# Patient Record
Sex: FEMALE | Race: WHITE | Employment: PART TIME | URBAN - METROPOLITAN AREA
[De-identification: names, ages, dates, MRNs, and addresses within clinical notes are randomized per-mention and may not be internally consistent; named-entity substitution may affect disease eponyms.]

---

## 2017-03-17 ENCOUNTER — HOSPITAL ENCOUNTER (OUTPATIENT)
Dept: CT IMAGING | Facility: HOSPITAL | Age: 67
Discharge: HOME | End: 2017-03-17
Attending: FAMILY MEDICINE
Payer: MEDICARE

## 2017-03-17 DIAGNOSIS — Z87.891 HISTORY OF SMOKING: ICD-10-CM

## 2017-03-18 NOTE — PROGRESS NOTES
Quick Note:    Citlalli Jaclyn Block    You have a few lung nodules that we will have to continue keeping an eye on, but stable. We will plan on repeating the CT scan in a year again!     Please call my office if you have any questions or do not completely und

## 2017-03-21 ENCOUNTER — HOSPITAL ENCOUNTER (OUTPATIENT)
Dept: MAMMOGRAPHY | Facility: HOSPITAL | Age: 67
Discharge: HOME | End: 2017-03-21
Attending: FAMILY MEDICINE
Payer: MEDICARE

## 2017-03-21 DIAGNOSIS — Z12.31 VISIT FOR SCREENING MAMMOGRAM: ICD-10-CM

## 2017-03-21 DIAGNOSIS — Z12.31 ENCOUNTER FOR SCREENING MAMMOGRAM FOR BREAST CANCER: ICD-10-CM

## 2017-03-21 PROCEDURE — 77063 BREAST TOMOSYNTHESIS BI: CPT

## 2017-03-21 PROCEDURE — 77067 SCR MAMMO BI INCL CAD: CPT

## 2017-03-23 NOTE — PROGRESS NOTES
Quick Note:    Citlalli Block    Mammogram is normal!     Please call my clinic if you have any questions or do not understand this message.     Take Care,   Ajit Blanton MD    ______

## 2017-05-07 ENCOUNTER — HOSPITAL ENCOUNTER (OUTPATIENT)
Facility: HOSPITAL | Age: 67
Setting detail: OBSERVATION
Discharge: HOME | End: 2017-05-08
Attending: EMERGENCY MEDICINE | Admitting: INTERNAL MEDICINE
Payer: MEDICARE

## 2017-05-07 ENCOUNTER — APPOINTMENT (OUTPATIENT)
Dept: GENERAL RADIOLOGY | Facility: HOSPITAL | Age: 67
End: 2017-05-07
Attending: EMERGENCY MEDICINE
Payer: MEDICARE

## 2017-05-07 DIAGNOSIS — R07.9 ACUTE CHEST PAIN: Primary | ICD-10-CM

## 2017-05-07 PROCEDURE — 85378 FIBRIN DEGRADE SEMIQUANT: CPT | Performed by: EMERGENCY MEDICINE

## 2017-05-07 PROCEDURE — 84484 ASSAY OF TROPONIN QUANT: CPT | Performed by: EMERGENCY MEDICINE

## 2017-05-07 PROCEDURE — 93005 ELECTROCARDIOGRAM TRACING: CPT

## 2017-05-07 PROCEDURE — 99285 EMERGENCY DEPT VISIT HI MDM: CPT

## 2017-05-07 PROCEDURE — 36415 COLL VENOUS BLD VENIPUNCTURE: CPT

## 2017-05-07 PROCEDURE — 80053 COMPREHEN METABOLIC PANEL: CPT | Performed by: EMERGENCY MEDICINE

## 2017-05-07 PROCEDURE — 71010 XR CHEST AP PORTABLE  (CPT=71010): CPT | Performed by: EMERGENCY MEDICINE

## 2017-05-07 PROCEDURE — 85025 COMPLETE CBC W/AUTO DIFF WBC: CPT | Performed by: EMERGENCY MEDICINE

## 2017-05-07 PROCEDURE — 93010 ELECTROCARDIOGRAM REPORT: CPT

## 2017-05-07 RX ORDER — VIT A/VIT C/VIT E/ZINC/COPPER 2148-113
1 TABLET ORAL 2 TIMES DAILY
COMMUNITY
End: 2017-10-06

## 2017-05-07 RX ORDER — PANTOPRAZOLE SODIUM 40 MG/1
40 TABLET, DELAYED RELEASE ORAL
Status: DISCONTINUED | OUTPATIENT
Start: 2017-05-08 | End: 2017-05-08

## 2017-05-07 RX ORDER — POLYETHYLENE GLYCOL 3350 17 G/17G
17 POWDER, FOR SOLUTION ORAL DAILY PRN
Status: DISCONTINUED | OUTPATIENT
Start: 2017-05-07 | End: 2017-05-08

## 2017-05-07 RX ORDER — BISACODYL 10 MG
10 SUPPOSITORY, RECTAL RECTAL
Status: DISCONTINUED | OUTPATIENT
Start: 2017-05-07 | End: 2017-05-08

## 2017-05-07 RX ORDER — PRAVASTATIN SODIUM 40 MG
40 TABLET ORAL NIGHTLY
COMMUNITY
End: 2017-08-01

## 2017-05-07 RX ORDER — NICOTINE 21 MG/24HR
1 PATCH, TRANSDERMAL 24 HOURS TRANSDERMAL DAILY
Status: DISCONTINUED | OUTPATIENT
Start: 2017-05-07 | End: 2017-05-08

## 2017-05-07 RX ORDER — ENOXAPARIN SODIUM 100 MG/ML
40 INJECTION SUBCUTANEOUS DAILY
Status: DISCONTINUED | OUTPATIENT
Start: 2017-05-07 | End: 2017-05-08

## 2017-05-07 RX ORDER — BUPROPION HYDROCHLORIDE 100 MG/1
100 TABLET ORAL 2 TIMES DAILY
COMMUNITY
End: 2017-07-06

## 2017-05-07 RX ORDER — ATORVASTATIN CALCIUM 10 MG/1
10 TABLET, FILM COATED ORAL NIGHTLY
Status: DISCONTINUED | OUTPATIENT
Start: 2017-05-07 | End: 2017-05-08

## 2017-05-07 RX ORDER — ONDANSETRON 2 MG/ML
4 INJECTION INTRAMUSCULAR; INTRAVENOUS EVERY 6 HOURS PRN
Status: DISCONTINUED | OUTPATIENT
Start: 2017-05-07 | End: 2017-05-08

## 2017-05-07 RX ORDER — TRAMADOL HYDROCHLORIDE 50 MG/1
50 TABLET ORAL EVERY 6 HOURS PRN
Status: DISCONTINUED | OUTPATIENT
Start: 2017-05-07 | End: 2017-05-08

## 2017-05-07 RX ORDER — ACETAMINOPHEN 325 MG/1
650 TABLET ORAL EVERY 6 HOURS PRN
Status: DISCONTINUED | OUTPATIENT
Start: 2017-05-07 | End: 2017-05-08

## 2017-05-07 RX ORDER — MYCOPHENOLATE MOFETIL 250 MG/1
500 CAPSULE ORAL 2 TIMES DAILY
Status: DISCONTINUED | OUTPATIENT
Start: 2017-05-07 | End: 2017-05-08

## 2017-05-07 RX ORDER — MYCOPHENOLATE MOFETIL 500 MG/1
500 TABLET ORAL 2 TIMES DAILY
COMMUNITY
End: 2017-10-06

## 2017-05-07 RX ORDER — ASPIRIN 81 MG/1
324 TABLET, CHEWABLE ORAL ONCE
Status: COMPLETED | OUTPATIENT
Start: 2017-05-07 | End: 2017-05-07

## 2017-05-07 RX ORDER — MELATONIN
325
Status: DISCONTINUED | OUTPATIENT
Start: 2017-05-08 | End: 2017-05-08

## 2017-05-07 RX ORDER — BUPROPION HYDROCHLORIDE 100 MG/1
100 TABLET ORAL 2 TIMES DAILY
Status: DISCONTINUED | OUTPATIENT
Start: 2017-05-07 | End: 2017-05-08

## 2017-05-07 NOTE — H&P
[facility] Hospitalist H&P       CC: chest pain    PCP: Jonathan Gillespie MD    History of Present Illness:     76 yo with HL, JAYSON, asthma, who is admitted for cp and RUE pain.  Pt reports yesterday, started having dull and sharp moderate pain to tricep region S-Adenosylmethionine (XOCHITL-E OR) Take 1 capsule by mouth daily. Disp:  Rfl:    aspirin 81 MG Oral Chew Tab Chew 81 mg by mouth daily. Disp:  Rfl:    Calcium Carbonate-Vitamin D 500-125 MG-UNIT Oral Tab Take 1 tablet by mouth daily.    Disp:  Rfl:    Ch tenderness or deformity. No epigastric or chest tenderness   Heart:  Regular rate and rhythm, S1, S2 normal, no murmur, rub or gallop appreciated, no LE edema   Abdomen:   Soft, non-tender.  Bowel sounds normal.  Non distended, no overt hernias   Extremitie is admitted for cp and RUE pain    **Atypical cp and RUE pain-doubt cardiac etiology  -trial of PPI and tylenol prn, tramadol prn  -pt with extensive FH of CAD, cards consulted to assess for stress. Appreciate. Spoke with RN  -serial trops ordered.  Initial

## 2017-05-07 NOTE — ED NOTES
Pt reevaluated by dr. Mallorie Delgado, informed of all test reports and plan of care, pt was advised admission. Verbalizing understanding.

## 2017-05-07 NOTE — PROGRESS NOTES
Brief Internal Medicine Note    Full Note to Follow      Pt is a 78 yo with HL, JAYSON, asthma, who is admitted for cp and RUE pain.   Pt reports yesterday, started having dull and sharp moderate pain to tricep region of RUE, worse with moving arm, and rashmi

## 2017-05-07 NOTE — ED PROVIDER NOTES
Patient Seen in: [facility] Emergency Department    History   Patient presents with:  Chest Pain Angina (cardiovascular)    Stated Complaint: chest pain    HPI    Patient is a 17-year-old female who states yesterday she developed chest pain which sh nightly. Alendronate Sodium 70 MG Oral Tab,  Take 1 tablet (70 mg total) by mouth once a week. S-Adenosylmethionine (XOCHITL-E OR),  Take 1 capsule by mouth daily. aspirin 81 MG Oral Chew Tab,  Chew 81 mg by mouth daily.      Calcium Carbonate-Vitamin D 05/07/17 1257 100 %   O2 Device 05/07/17 1257 None (Room air)       Current:/60 mmHg  Pulse 74  Temp(Src) 98.1 °F (36.7 °C) (Oral)  Resp 18  Ht 162.6 cm (5' 4\")  Wt 68.04 kg  BMI 25.73 kg/m2  SpO2 97%        Physical Exam  GENERAL: Patient resting c GOLD   RAINBOW DRAW LAVENDER   RAINBOW DRAW LIGHT GREEN   CBC W/ DIFFERENTIAL      EKG    Rate, intervals and axes as noted on EKG Report.   Rate: 88  Rhythm: Sinus Rhythm  Reading: Normal sinus rhythm, no acute changes          Chest x-ray no acute abnorma

## 2017-05-08 ENCOUNTER — APPOINTMENT (OUTPATIENT)
Dept: CV DIAGNOSTICS | Facility: HOSPITAL | Age: 67
End: 2017-05-08
Attending: INTERNAL MEDICINE
Payer: MEDICARE

## 2017-05-08 VITALS
RESPIRATION RATE: 18 BRPM | BODY MASS INDEX: 25.61 KG/M2 | TEMPERATURE: 98 F | WEIGHT: 150 LBS | HEIGHT: 64 IN | DIASTOLIC BLOOD PRESSURE: 44 MMHG | SYSTOLIC BLOOD PRESSURE: 114 MMHG | HEART RATE: 92 BPM | OXYGEN SATURATION: 100 %

## 2017-05-08 PROCEDURE — 93018 CV STRESS TEST I&R ONLY: CPT | Performed by: INTERNAL MEDICINE

## 2017-05-08 PROCEDURE — 93306 TTE W/DOPPLER COMPLETE: CPT | Performed by: INTERNAL MEDICINE

## 2017-05-08 PROCEDURE — 80048 BASIC METABOLIC PNL TOTAL CA: CPT | Performed by: HOSPITALIST

## 2017-05-08 PROCEDURE — 84484 ASSAY OF TROPONIN QUANT: CPT | Performed by: HOSPITALIST

## 2017-05-08 PROCEDURE — 83735 ASSAY OF MAGNESIUM: CPT | Performed by: HOSPITALIST

## 2017-05-08 PROCEDURE — 78452 HT MUSCLE IMAGE SPECT MULT: CPT | Performed by: INTERNAL MEDICINE

## 2017-05-08 PROCEDURE — 80061 LIPID PANEL: CPT | Performed by: INTERNAL MEDICINE

## 2017-05-08 PROCEDURE — 93017 CV STRESS TEST TRACING ONLY: CPT | Performed by: INTERNAL MEDICINE

## 2017-05-08 RX ORDER — PANTOPRAZOLE SODIUM 40 MG/1
40 TABLET, DELAYED RELEASE ORAL
Qty: 30 TABLET | Refills: 0 | Status: SHIPPED | OUTPATIENT
Start: 2017-05-08 | End: 2017-10-06

## 2017-05-08 NOTE — CONSULTS
[facility] Cardiology Consultation Marci Navas MD    The patient was interviewed, examined, the chart was reviewed and the consult was dictated.     This is a 77year old female with a chief complaint of right upper extremity discomfort and chest pressure    Imp

## 2017-05-08 NOTE — PLAN OF CARE
Problem: PAIN - ADULT  Goal: Verbalizes/displays adequate comfort level or patient’s stated pain goal  INTERVENTIONS:  - Encourage pt to monitor pain and request assistance  - Assess pain using appropriate pain scale  - Administer analgesics based on type coordinating discharge planning if the patient needs post-hospital services based on physician/LIP order or complex needs related to functional status, cognitive ability or social support system   Outcome: [address]

## 2017-05-08 NOTE — PROGRESS NOTES
Kat Plunkett for patient to have [facility] stress test this am per JORGE Marcano for Dr Antonio Brown. Jennifer Cruz

## 2017-05-08 NOTE — PROGRESS NOTES
[facility] Hospitalist Progress Note     PCP: Ira Zamudio MD    CC:  Follow up    SUBJECTIVE:  Pt laying in bed, having echo done. No chest pain.  Some mild R shoulder/tricep pain upon lifting arm up--says did help transfer her sister the other day    OBJECTI Oral BID   • atorvastatin  10 mg Oral Nightly   • nicotine  1 patch Transdermal Daily   • Pantoprazole Sodium  40 mg Oral QAM AC   • enoxaparin  40 mg Subcutaneous Daily        acetaminophen, PEG 3350, magnesium hydroxide, bisacodyl, ondansetron HCl, TraMA

## 2017-05-08 NOTE — CONSULTS
[facility]    PATIENT'S NAME: Edvin Lees   ATTENDING PHYSICIAN: Micaela Clements. Bhavesh Mahan MD   CONSULTING PHYSICIAN: Princess Bryan M.D.    PATIENT ACCOUNT#:   [de-identified]    LOCATION:  [address] [place] [address]  MEDICAL RECORD #:   [id number]       DATE OF BIRTH: Patient is a 2-pack a day smoker since she was 24. 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 FAMILY HISTORY:  Positive for heart disease.     REVIEW OF SYSTEMS:  Anxiety, stress, arm pain, chest discomfort

## 2017-05-12 NOTE — PROGRESS NOTES
Quick Note:    Citlalli Block    Your results are unremarkable. Stress test is normal!    Please call my office if you have any questions or do not completely understand this message.     Take Care,   Alton Keen  ______

## 2017-07-25 PROCEDURE — 84480 ASSAY TRIIODOTHYRONINE (T3): CPT | Performed by: INTERNAL MEDICINE

## 2017-08-01 PROBLEM — R07.9 ACUTE CHEST PAIN: Status: RESOLVED | Noted: 2017-05-07 | Resolved: 2017-08-01

## 2017-08-01 PROBLEM — Z23 NEED FOR SHINGLES VACCINE: Status: ACTIVE | Noted: 2017-08-01

## 2017-08-09 ENCOUNTER — HOSPITAL ENCOUNTER (OUTPATIENT)
Dept: BONE DENSITY | Age: 67
Discharge: HOME | End: 2017-08-09
Attending: FAMILY MEDICINE
Payer: MEDICARE

## 2017-08-09 DIAGNOSIS — M81.0 OSTEOPOROSIS, UNSPECIFIED OSTEOPOROSIS TYPE, UNSPECIFIED PATHOLOGICAL FRACTURE PRESENCE: ICD-10-CM

## 2017-08-09 PROCEDURE — 77080 DXA BONE DENSITY AXIAL: CPT | Performed by: FAMILY MEDICINE

## 2017-08-10 PROBLEM — M85.80 OSTEOPENIA: Status: ACTIVE | Noted: 2017-08-10

## 2017-08-10 NOTE — PROGRESS NOTES
Hello,     You no longer have osteoporosis and have the next step better : Osteopenia. This is good news. I believe we had talked about stopping the Alendronate as you have been on that for quite sometime.   If you are still comfortable, I would like you t

## 2018-03-27 PROBLEM — I25.10 CORONARY ARTERY CALCIFICATION: Status: ACTIVE | Noted: 2018-03-27

## 2018-03-27 PROBLEM — I25.84 CORONARY ARTERY CALCIFICATION: Status: ACTIVE | Noted: 2018-03-27

## 2018-05-01 PROCEDURE — 86160 COMPLEMENT ANTIGEN: CPT | Performed by: INTERNAL MEDICINE

## 2018-06-22 ENCOUNTER — OFFICE VISIT (OUTPATIENT)
Dept: SLEEP CENTER | Facility: HOSPITAL | Age: 68
End: 2018-06-22
Attending: INTERNAL MEDICINE
Payer: MEDICARE

## 2018-06-22 DIAGNOSIS — G47.33 OSA (OBSTRUCTIVE SLEEP APNEA): ICD-10-CM

## 2018-06-22 DIAGNOSIS — R06.83 SNORING: ICD-10-CM

## 2018-06-22 DIAGNOSIS — G47.10 HYPERSOMNIA: ICD-10-CM

## 2018-06-22 PROCEDURE — 95810 POLYSOM 6/> YRS 4/> PARAM: CPT

## 2018-06-26 NOTE — PROCEDURES
[address]       Accredited by the [facility] ([facility])    PATIENT'S NAME:        Phebe Schwab  ATTENDING PHYSICIAN:   Mary Ritter M.D. REFERRING PHYSICIAN:   Mary Ritter M.D.   PATIENT A time 304 minutes, sleep onset latency 18 minutes, REM onset latency 193 minutes, wake after sleep onset 84 minutes, for a sleep efficiency of 75%. SLEEP STAGING:  Stage 1, 1%; stage 2, 55%; stage 3, 28%; stage REM 15%.     RESPIRATORY MEASURES:  Patient

## 2018-07-25 PROCEDURE — 86225 DNA ANTIBODY NATIVE: CPT | Performed by: INTERNAL MEDICINE

## 2019-02-15 PROCEDURE — 86160 COMPLEMENT ANTIGEN: CPT | Performed by: INTERNAL MEDICINE

## 2019-02-15 PROCEDURE — 86225 DNA ANTIBODY NATIVE: CPT | Performed by: INTERNAL MEDICINE

## 2019-06-27 PROCEDURE — 82785 ASSAY OF IGE: CPT | Performed by: ALLERGY & IMMUNOLOGY

## 2019-06-27 PROCEDURE — 86003 ALLG SPEC IGE CRUDE XTRC EA: CPT | Performed by: ALLERGY & IMMUNOLOGY

## 2019-06-27 PROCEDURE — 36415 COLL VENOUS BLD VENIPUNCTURE: CPT | Performed by: ALLERGY & IMMUNOLOGY

## 2019-07-18 PROCEDURE — 86160 COMPLEMENT ANTIGEN: CPT | Performed by: INTERNAL MEDICINE

## 2020-05-08 PROBLEM — J43.9 PULMONARY EMPHYSEMA, UNSPECIFIED EMPHYSEMA TYPE (HCC): Status: ACTIVE | Noted: 2020-05-08

## 2020-10-19 ENCOUNTER — HOSPITAL ENCOUNTER (EMERGENCY)
Facility: HOSPITAL | Age: 70
Discharge: LEFT BEFORE DISCHARGE | End: 2020-10-19
Payer: MEDICARE

## 2020-11-02 ENCOUNTER — LAB ENCOUNTER (OUTPATIENT)
Dept: LAB | Facility: HOSPITAL | Age: 70
End: 2020-11-02
Attending: INTERNAL MEDICINE
Payer: MEDICARE

## 2020-11-02 DIAGNOSIS — E78.2 MIXED HYPERLIPIDEMIA: ICD-10-CM

## 2020-11-02 DIAGNOSIS — I10 ESSENTIAL HYPERTENSION: ICD-10-CM

## 2020-11-02 DIAGNOSIS — R42 DIZZINESS: Primary | ICD-10-CM

## 2020-11-02 DIAGNOSIS — R73.09 ELEVATED GLUCOSE: ICD-10-CM

## 2020-11-02 DIAGNOSIS — R76.8 POSITIVE ANA (ANTINUCLEAR ANTIBODY): ICD-10-CM

## 2020-11-02 PROCEDURE — 86160 COMPLEMENT ANTIGEN: CPT

## 2020-11-02 PROCEDURE — 80053 COMPREHEN METABOLIC PANEL: CPT

## 2020-11-02 PROCEDURE — 80061 LIPID PANEL: CPT

## 2020-11-02 PROCEDURE — 82565 ASSAY OF CREATININE: CPT

## 2020-11-02 PROCEDURE — 84443 ASSAY THYROID STIM HORMONE: CPT

## 2020-11-02 PROCEDURE — 85025 COMPLETE CBC W/AUTO DIFF WBC: CPT

## 2020-11-02 PROCEDURE — 85652 RBC SED RATE AUTOMATED: CPT

## 2020-11-02 PROCEDURE — 82248 BILIRUBIN DIRECT: CPT

## 2020-11-02 PROCEDURE — 36415 COLL VENOUS BLD VENIPUNCTURE: CPT

## 2020-11-02 PROCEDURE — 86140 C-REACTIVE PROTEIN: CPT

## 2020-11-02 PROCEDURE — 83036 HEMOGLOBIN GLYCOSYLATED A1C: CPT

## 2021-01-20 ENCOUNTER — IMMUNIZATION (OUTPATIENT)
Dept: LAB | Facility: HOSPITAL | Age: 71
End: 2021-01-20
Attending: EMERGENCY MEDICINE
Payer: MEDICARE

## 2021-01-20 DIAGNOSIS — Z23 NEED FOR VACCINATION: Primary | ICD-10-CM

## 2021-01-20 PROCEDURE — 0011A SARSCOV2 VAC 100MCG/0.5ML IM: CPT

## 2021-02-17 ENCOUNTER — IMMUNIZATION (OUTPATIENT)
Dept: LAB | Facility: HOSPITAL | Age: 71
End: 2021-02-17
Attending: EMERGENCY MEDICINE
Payer: MEDICARE

## 2021-02-17 DIAGNOSIS — Z23 NEED FOR VACCINATION: Primary | ICD-10-CM

## 2021-02-17 PROCEDURE — 0012A SARSCOV2 VAC 100MCG/0.5ML IM: CPT

## 2021-04-14 PROBLEM — R19.7 DIARRHEA: Status: ACTIVE | Noted: 2021-04-14

## 2021-04-14 PROBLEM — K21.9 GASTROESOPHAGEAL REFLUX DISEASE: Status: ACTIVE | Noted: 2021-04-14

## 2021-04-19 ENCOUNTER — LAB ENCOUNTER (OUTPATIENT)
Dept: LAB | Facility: HOSPITAL | Age: 71
End: 2021-04-19
Attending: NURSE PRACTITIONER
Payer: MEDICARE

## 2021-04-19 DIAGNOSIS — R19.7 DIARRHEA, UNSPECIFIED TYPE: ICD-10-CM

## 2021-04-19 PROCEDURE — 83516 IMMUNOASSAY NONANTIBODY: CPT

## 2021-04-19 PROCEDURE — 36415 COLL VENOUS BLD VENIPUNCTURE: CPT

## 2021-04-19 PROCEDURE — 82784 ASSAY IGA/IGD/IGG/IGM EACH: CPT

## 2021-04-20 ENCOUNTER — LAB ENCOUNTER (OUTPATIENT)
Dept: LAB | Facility: HOSPITAL | Age: 71
End: 2021-04-20
Attending: NURSE PRACTITIONER
Payer: MEDICARE

## 2021-04-20 DIAGNOSIS — R19.7 DIARRHEA, UNSPECIFIED TYPE: ICD-10-CM

## 2021-04-20 PROCEDURE — 87338 HPYLORI STOOL AG IA: CPT

## 2021-04-20 PROCEDURE — 83993 ASSAY FOR CALPROTECTIN FECAL: CPT

## 2021-04-20 PROCEDURE — 87329 GIARDIA AG IA: CPT

## 2021-04-20 PROCEDURE — 87493 C DIFF AMPLIFIED PROBE: CPT

## 2021-04-20 PROCEDURE — 87272 CRYPTOSPORIDIUM AG IF: CPT

## 2021-04-20 PROCEDURE — 82656 EL-1 FECAL QUAL/SEMIQ: CPT

## 2021-04-27 ENCOUNTER — OFFICE VISIT (OUTPATIENT)
Dept: SURGERY | Facility: CLINIC | Age: 71
End: 2021-04-27
Payer: MEDICARE

## 2021-04-27 VITALS — HEART RATE: 82 BPM | SYSTOLIC BLOOD PRESSURE: 155 MMHG | TEMPERATURE: 97 F | DIASTOLIC BLOOD PRESSURE: 69 MMHG

## 2021-04-27 DIAGNOSIS — N39.41 URGE INCONTINENCE: ICD-10-CM

## 2021-04-27 DIAGNOSIS — R82.90 URINE FINDING: Primary | ICD-10-CM

## 2021-04-27 PROCEDURE — 99203 OFFICE O/P NEW LOW 30 MIN: CPT | Performed by: UROLOGY

## 2021-04-27 PROCEDURE — 81003 URINALYSIS AUTO W/O SCOPE: CPT | Performed by: UROLOGY

## 2021-04-27 RX ORDER — OXYBUTYNIN CHLORIDE 10 MG/1
10 TABLET, EXTENDED RELEASE ORAL DAILY
Qty: 90 TABLET | Refills: 3 | Status: SHIPPED | OUTPATIENT
Start: 2021-04-27 | End: 2022-01-17

## 2021-04-27 NOTE — PROGRESS NOTES
Rooming Clinician: Andrea Jaquez is a 79year old female. Patient presents with:  Consult: c/o incontinence  3 years. Has seen blood on tissue after wiping. Lower abd pain.  3/10 on pain scal  Incontinence:  Stream: weak to dribbles, sometimes queta lisinopril 10 MG Oral Tab Take 10 mg by mouth daily. • FLUAD QUADRIVALENT 0.5 ML Intramuscular Prefilled Syringe Inject 0.5 mL into the muscle See Admin Instructions.  UNTIL FINISHED     • alendronate 35 MG Oral Tab Take 1 tablet (35 mg total) by mouth pain/belching Since a little girl had a lot of gas   • Food intolerance Too much black licorice causes reflux at night   • Hearing loss So says sister   • Heartburn March 2020    Reflux wakes me at night   • High cholesterol Taking prav a statin for a numb Alcohol/week: 0.0 standard drinks      Comment: Not much    Drug use: No       REVIEW OF SYSTEMS:     GENERAL HEALTH: feels well otherwise  SKIN: denies any unusual skin lesions or rashes  RESPIRATORY: denies shortness of breath with exertion  CARDIOVASCUL

## 2021-04-28 NOTE — PROGRESS NOTES
Your recent urine cytology shows no evidence of cancer cells and is normal.  Recommend follow up in the office as directed.     Sincerely,  Jazmin Carmona MD

## 2021-05-17 PROBLEM — K52.9 CHRONIC DIARRHEA: Status: ACTIVE | Noted: 2021-05-17

## 2021-06-03 ENCOUNTER — OFFICE VISIT (OUTPATIENT)
Dept: SURGERY | Facility: CLINIC | Age: 71
End: 2021-06-03
Payer: MEDICARE

## 2021-06-03 VITALS — SYSTOLIC BLOOD PRESSURE: 125 MMHG | HEART RATE: 86 BPM | TEMPERATURE: 97 F | DIASTOLIC BLOOD PRESSURE: 72 MMHG

## 2021-06-03 DIAGNOSIS — N39.41 URGE INCONTINENCE: ICD-10-CM

## 2021-06-03 DIAGNOSIS — R82.90 URINE FINDING: Primary | ICD-10-CM

## 2021-06-03 PROCEDURE — 81003 URINALYSIS AUTO W/O SCOPE: CPT | Performed by: UROLOGY

## 2021-06-03 PROCEDURE — 99213 OFFICE O/P EST LOW 20 MIN: CPT | Performed by: UROLOGY

## 2021-06-03 NOTE — PROGRESS NOTES
Rooming Clinician: Marni Iqbal is a 79year old female. Patient presents with: Follow - Up: review voiding diary        HPI:     Patient is doing much better on oxybutynin. Can hold more urine. Since the incontinence has been resolved.   No need sulfate 325 (65 FE) MG Oral Tab EC Take 325 mg by mouth daily with breakfast.     • omega-3 fatty acids 1000 MG Oral Cap Take 1,000 mg by mouth daily. • Coenzyme Q-10 100 MG Oral Cap Take 100 mg by mouth daily.          Latex                   RASH   Pa Shortness of breath Since maybe aug 2020.  Not bad   • Sleep disturbance Maybe since march 2020   • Stress Sisters caregiver since 2013   • Urge incontinence    • Urticaria    • Visual impairment    • Wears glasses    • Weight gain I eat junk at night have (H) 12/04/2020    CA 10.0 12/04/2020    ALB 4.5 03/11/2021    ALKPHO 51 (L) 03/11/2021    AST 23 03/11/2021    ALT 28 03/11/2021       X-RAY[de-identified]         ASSESSMENT:     Urgency incontinence/resolved    PLAN:     Continue oxybutynin ER 10 mg daily    Diagnose

## 2022-01-14 DIAGNOSIS — N39.41 URGE INCONTINENCE: Primary | ICD-10-CM

## 2022-01-17 RX ORDER — OXYBUTYNIN CHLORIDE 10 MG/1
TABLET, EXTENDED RELEASE ORAL
Qty: 90 TABLET | Refills: 2 | Status: SHIPPED | OUTPATIENT
Start: 2022-01-17

## 2022-01-17 NOTE — TELEPHONE ENCOUNTER
This RN approved the refill request of Oxybutynin and sent to [address] [place] [address].     Patient's last office visit was on 6/3/21.      Overactive Bladder Medications    Protocol criteria:  • Appointment scheduled in the past 12 months or in the next 2 months      Recent

## 2022-09-19 RX ORDER — OXYBUTYNIN CHLORIDE 10 MG/1
10 TABLET, EXTENDED RELEASE ORAL DAILY
Qty: 90 TABLET | Refills: 0 | Status: SHIPPED | OUTPATIENT
Start: 2022-09-19

## 2022-11-09 ENCOUNTER — OFFICE VISIT (OUTPATIENT)
Facility: LOCATION | Age: 72
End: 2022-11-09
Payer: MEDICARE

## 2022-11-09 VITALS — HEART RATE: 98 BPM | TEMPERATURE: 98 F

## 2022-11-09 DIAGNOSIS — K62.89 PERIANAL CYST: Primary | ICD-10-CM

## 2022-11-09 RX ORDER — AMOXICILLIN AND CLAVULANATE POTASSIUM 875; 125 MG/1; MG/1
1 TABLET, FILM COATED ORAL 2 TIMES DAILY
Qty: 14 TABLET | Refills: 0 | Status: SHIPPED | OUTPATIENT
Start: 2022-11-09 | End: 2022-11-16

## 2022-11-22 ENCOUNTER — OFFICE VISIT (OUTPATIENT)
Facility: LOCATION | Age: 72
End: 2022-11-22
Payer: MEDICARE

## 2022-11-22 DIAGNOSIS — K62.89 PERIANAL CYST: Primary | ICD-10-CM

## 2022-11-22 PROCEDURE — 99212 OFFICE O/P EST SF 10 MIN: CPT | Performed by: SURGERY

## 2023-02-17 RX ORDER — OXYBUTYNIN CHLORIDE 10 MG/1
TABLET, EXTENDED RELEASE ORAL
Qty: 90 TABLET | Refills: 0 | OUTPATIENT
Start: 2023-02-17

## 2023-02-17 NOTE — TELEPHONE ENCOUNTER
Refill request for Oxybutynin. LOV greater than 1 year ago on 6/3/2021 and no future appts made. Refill request will be denied at this time and mcm sent.

## 2023-03-13 ENCOUNTER — OFFICE VISIT (OUTPATIENT)
Dept: SURGERY | Facility: CLINIC | Age: 73
End: 2023-03-13

## 2023-03-13 DIAGNOSIS — N39.41 URGE INCONTINENCE: Primary | ICD-10-CM

## 2023-03-13 DIAGNOSIS — R82.90 URINE FINDING: ICD-10-CM

## 2023-03-13 LAB
APPEARANCE: CLEAR
BILIRUBIN: NEGATIVE
GLUCOSE (URINE DIPSTICK): NEGATIVE MG/DL
KETONES (URINE DIPSTICK): NEGATIVE MG/DL
LEUKOCYTES: NEGATIVE
MULTISTIX LOT#: NORMAL NUMERIC
NITRITE, URINE: NEGATIVE
OCCULT BLOOD: NEGATIVE
PH, URINE: 5 (ref 4.5–8)
SPECIFIC GRAVITY: 1.03 (ref 1–1.03)
URINE-COLOR: YELLOW
UROBILINOGEN,SEMI-QN: 0.2 MG/DL (ref 0–1.9)

## 2023-03-13 RX ORDER — OXYBUTYNIN CHLORIDE 10 MG/1
10 TABLET, EXTENDED RELEASE ORAL DAILY
Qty: 90 TABLET | Refills: 6 | Status: SHIPPED | OUTPATIENT
Start: 2023-03-13

## 2023-08-21 PROBLEM — J43.9 PULMONARY EMPHYSEMA (HCC): Status: ACTIVE | Noted: 2020-05-08

## 2023-08-21 PROBLEM — J02.9 SORE THROAT: Status: ACTIVE | Noted: 2019-12-19

## 2023-08-21 PROBLEM — Z00.00 GENERAL MEDICAL EXAMINATION: Status: ACTIVE | Noted: 2023-08-21

## 2023-08-21 PROBLEM — H00.15 CHALAZION OF LEFT LOWER EYELID: Status: ACTIVE | Noted: 2019-06-22

## 2023-08-21 PROBLEM — J01.90 ACUTE SINUSITIS: Status: ACTIVE | Noted: 2019-12-19

## 2023-08-21 PROBLEM — H35.30 MACULAR DEGENERATION OF BOTH EYES: Status: ACTIVE | Noted: 2018-07-21

## 2023-08-21 PROBLEM — E78.1 PURE HYPERGLYCERIDEMIA: Status: ACTIVE | Noted: 2023-08-21

## 2023-08-21 PROBLEM — D64.9 ANEMIA: Status: ACTIVE | Noted: 2023-08-21

## 2023-08-21 PROBLEM — H25.13 AGE-RELATED NUCLEAR CATARACT OF BOTH EYES: Status: ACTIVE | Noted: 2022-07-16

## 2023-08-30 ENCOUNTER — TELEPHONE (OUTPATIENT)
Dept: CASE MANAGEMENT | Facility: HOSPITAL | Age: 73
End: 2023-08-30

## 2024-02-20 PROCEDURE — 88305 TISSUE EXAM BY PATHOLOGIST: CPT | Performed by: OBSTETRICS & GYNECOLOGY

## 2024-02-21 ENCOUNTER — LAB REQUISITION (OUTPATIENT)
Dept: LAB | Facility: HOSPITAL | Age: 74
End: 2024-02-21
Payer: MEDICARE

## 2024-02-21 DIAGNOSIS — N95.0 POSTMENOPAUSAL BLEEDING: ICD-10-CM

## 2024-06-26 RX ORDER — MAGNESIUM OXIDE 400 MG (241.3 MG MAGNESIUM) TABLET
TABLET NIGHTLY PRN
COMMUNITY
Start: 2023-01-01

## 2024-06-26 RX ORDER — LEVOCETIRIZINE DIHYDROCHLORIDE 5 MG/1
5 TABLET, FILM COATED ORAL NIGHTLY
COMMUNITY
Start: 2024-02-01

## 2024-06-26 RX ORDER — EZETIMIBE 10 MG/1
TABLET ORAL
COMMUNITY
Start: 2024-05-09

## 2024-07-04 ENCOUNTER — PATIENT MESSAGE (OUTPATIENT)
Dept: SURGERY | Facility: CLINIC | Age: 74
End: 2024-07-04

## 2024-07-07 PROBLEM — M17.12 OSTEOARTHRITIS OF LEFT KNEE, UNSPECIFIED OSTEOARTHRITIS TYPE: Status: ACTIVE | Noted: 2024-07-07

## 2024-07-07 NOTE — DISCHARGE INSTRUCTIONS
DISCHARGE INSTRUCTIONS:  PLACE ICE TO SURGICAL AREA 20-30 MINUTES ON/ 20-30 MINUTES OFF. THIS IS TO HELP WITH PAIN & SWELLING.    TAKE YOUR MEDICATIONS BELOW AS PRESCRIBED BY YOUR DOCTOR. THESE HAVE BEEN SENT TO YOUR PHARMACY.    IT IS OK TO BEAR WEIGHT AS TOLERATED ON THE OPERATIVE EXTREMITY.     CALL TO CONFIRM YOUR FOLLOW UP APPOINTMENT WITH DR. BEHERY TO BE SEEN IN 2-3 WEEKS POSTOP. [phone]    MEDICATIONS:    POST OP MEDICATION REGIMEN              MULTI-MODAL   PAIN REGIMEN     DRUG   FOR   FREQUENCY & DURATION   QTY   NOTES     WEANING  TIPS       Gabapentin 100mg   Nerve pain   Take 2 tabs every 8 hours for 14 days    90   No refills You may discontinue this medication prior to 14 days if you do not tolerate it.        Tylenol 500mg     Mild pain   Take 2 tabs every 6 hours     90   Can purchase over-the-counter    Stop using medication if no longer having pain.      Tramadol 50mg     Moderate pain   Take 1-2 tabs every 6 hours only as needed   45 May prescribe a refill, but ideally, this should be tapered off after surgery Stop using this medication 2nd   As pain decreases over time, increase the interval between doses (6 hours to 8, then 12, then 24) to taper off the medication.      Meloxicam 15mg     Inflammation   Take 1 tab daily for 30 days     30   May refill if needed beyond 30 days   Recommend completing the 30 day supply.           BREAKTHROUGH PAIN         Oxycodone 5mg       Severe pain     Take 1-2 tabs every 4-6 hours only as needed for severe pain       40   Take at least 1 hr apart from Tramadol.    Ideally, no refill. The goal is to taper off this medication as soon as possible, as it can be addictive and have side effects.    Stop using this medication 1st if no longer having severe pain.         BLOOD THINNER     Aspirin 81mg   Blood clot prevention   Take 1 tab twice daily for 30 days     60     No refills   Complete the entire course of your blood thinner.                      STOOL  SOFTENER     Sennokot 8.6/50mg   Constipation   Take 1 tab twice daily  while on opioids     60 Refer to discharge instructions if constipation persists even after taking this medication   Stop taking if having diarrhea or loose stools.           ANTI-NAUSEA   Ondansetron 4mg   Nausea   Take 1 tab every 8 hours as needed if nauseous     20   No Refill      ANTI-ACID REFLUX / GASTRITIS   Pantoprazole 40mg   Acid reflux, gastric ulcer prophylaxis   Take 1 tab every day along with Meloxicam     60   May refill if Meloxicam refilled          DRESSING:    YOU MAY REMOVE ACE BANDAGE AND COTTON WRAP 2  DAYS AFTER SURGERY    YOU HAVE A SPECIAL DRESSING CALLED AN AQUACEL DRESSING OVER YOUR INCISION.    THE DRESSING STAYS FOR 12 DAYS FROM SURGERY.    YOU MAY SHOWER AS SOON AS YOU RETURN HOME WITH THE AQUACEL DRESSING INTACT OVER YOUR INCISION AREA.    IF THE DRESSING LEAKS OR COMES LOOSE BEFORE THE 7 DAY PERIOD CONTACT YOUR DOCTOR / SURGEON.    AFTER   12 DAYS THE DRESSING IS REMOVED BY PULLING ON THE EDGE OF THE DRESSING AND GENTLY STRETCHING IT, THEN PEEL IT OFF SLOWLY LIKE A BANDAID. IF YOU HAVE ANY QUESTIONS OR CONCERNS ABOUT THE DRESSING CONTACT YOUR DOCTOR.    IF DRAINAGE IS PRESENT AT ANYTIME FROM YOUR DRESSING OR FROM YOUR INCISION CALL YOUR DOCTOR / SURGEON AS SOON AS POSSIBLE.      REASONS TO CALL YOUR DOCTOR:  TEMPERATURE .0 OR MORE  PERSISTANT NAUSEA OR VOMITTING - ESPECIALLY IF YOU ARE UNABLE TO EAT OR DRINK.  INCREASED LEVEL OF PAIN OR PAIN WHICH IS NOT CONTROLLED BY YOUR PAIN MEDICINE.  PERSISTENT DRAINAGE AT ANYTIME FROM YOUR SURGICAL AREA / INCISION.  PAIN, TENDERNESS OR SUDDEN SWELLING IN YOUR CALF REGION OF THE LOWER EXTREMITIES - THIS IS A POSSIBLE SIGN OF A BLOOD CLOT.  ANY CHANGES IN SENSATION IN YOUR BODY IN THE AREA OF YOUR SURGERY = NUMBNESS OR TINGLING.  ANY CHANGES IN CIRCULATION IN YOUR BODY IN THE AREA OF YOUR SURGERY = CHANGES  IN COLOR EITHER DARKER OR VERY PALE; OR  IF AREA FEELS COLD TO  THE TOUCH AS COMPARED TO OTHER AREAS.  ANY CHANGES IN FUNCTION IN YOUR BODY IN THE AREA OF YOUR SURGERY = CHANGE IN MOVEMENT - UNABLE TO MOVE OR WIGGLE FINGERS, TOES OR FOOT OR ANY OTHER CHANGES IN NORMAL BODY FUNCTIONING.  FOR ANY OF THE ABOVE OR ANY OTHER QUESTIONS OR CONCERNS CALL YOUR DOCTOR/ SURGEON AS SOON AS POSSIBLE.    Omar Behery, MD MPH  Orthopaedic Surgeon, Adult Hip and Knee Reconstruction  [facility] at [address]  [place] [address]  Office: (P) [phone] (F) [phone]  Adminstrative Assistant: Diane Gómez     [facility]  [address] [place] [address] [address]  [place] [address]  Phone: [phone]  Fax: [phone]

## 2024-07-08 ENCOUNTER — HOSPITAL ENCOUNTER (INPATIENT)
Facility: HOSPITAL | Age: 74
LOS: 2 days | Discharge: HOME HEALTH | End: 2024-07-10
Attending: STUDENT IN AN ORGANIZED HEALTH CARE EDUCATION/TRAINING PROGRAM | Admitting: STUDENT IN AN ORGANIZED HEALTH CARE EDUCATION/TRAINING PROGRAM
Payer: MEDICARE

## 2024-07-08 ENCOUNTER — ANESTHESIA (OUTPATIENT)
Dept: SURGERY | Facility: HOSPITAL | Age: 74
End: 2024-07-08
Payer: MEDICARE

## 2024-07-08 ENCOUNTER — APPOINTMENT (OUTPATIENT)
Dept: GENERAL RADIOLOGY | Facility: HOSPITAL | Age: 74
End: 2024-07-08
Attending: STUDENT IN AN ORGANIZED HEALTH CARE EDUCATION/TRAINING PROGRAM
Payer: MEDICARE

## 2024-07-08 ENCOUNTER — ANESTHESIA EVENT (OUTPATIENT)
Dept: SURGERY | Facility: HOSPITAL | Age: 74
End: 2024-07-08
Payer: MEDICARE

## 2024-07-08 DIAGNOSIS — M17.12 PRIMARY OSTEOARTHRITIS OF LEFT KNEE: ICD-10-CM

## 2024-07-08 PROBLEM — I10 ESSENTIAL HYPERTENSION: Status: ACTIVE | Noted: 2024-07-08

## 2024-07-08 LAB
ANION GAP SERPL CALC-SCNC: 6 MMOL/L (ref 0–18)
BUN BLD-MCNC: 17 MG/DL (ref 9–23)
BUN/CREAT SERPL: 22.7 (ref 10–20)
CALCIUM BLD-MCNC: 9.1 MG/DL (ref 8.7–10.4)
CHLORIDE SERPL-SCNC: 111 MMOL/L (ref 98–112)
CO2 SERPL-SCNC: 26 MMOL/L (ref 21–32)
CREAT BLD-MCNC: 0.75 MG/DL
DEPRECATED RDW RBC AUTO: 42.4 FL (ref 35.1–46.3)
EGFRCR SERPLBLD CKD-EPI 2021: 84 ML/MIN/1.73M2 (ref 60–?)
ERYTHROCYTE [DISTWIDTH] IN BLOOD BY AUTOMATED COUNT: 13.2 % (ref 11–15)
GLUCOSE BLD-MCNC: 128 MG/DL (ref 70–99)
HCT VFR BLD AUTO: 39.4 %
HGB BLD-MCNC: 13.4 G/DL
MCH RBC QN AUTO: 29.9 PG (ref 26–34)
MCHC RBC AUTO-ENTMCNC: 34 G/DL (ref 31–37)
MCV RBC AUTO: 87.9 FL
OSMOLALITY SERPL CALC.SUM OF ELEC: 299 MOSM/KG (ref 275–295)
PLATELET # BLD AUTO: 149 10(3)UL (ref 150–450)
POTASSIUM SERPL-SCNC: 4 MMOL/L (ref 3.5–5.1)
RBC # BLD AUTO: 4.48 X10(6)UL
SODIUM SERPL-SCNC: 143 MMOL/L (ref 136–145)
WBC # BLD AUTO: 6 X10(3) UL (ref 4–11)

## 2024-07-08 PROCEDURE — 73560 X-RAY EXAM OF KNEE 1 OR 2: CPT | Performed by: STUDENT IN AN ORGANIZED HEALTH CARE EDUCATION/TRAINING PROGRAM

## 2024-07-08 PROCEDURE — 3E0T3BZ INTRODUCTION OF ANESTHETIC AGENT INTO PERIPHERAL NERVES AND PLEXI, PERCUTANEOUS APPROACH: ICD-10-PCS | Performed by: STUDENT IN AN ORGANIZED HEALTH CARE EDUCATION/TRAINING PROGRAM

## 2024-07-08 PROCEDURE — 0SRD0J9 REPLACEMENT OF LEFT KNEE JOINT WITH SYNTHETIC SUBSTITUTE, CEMENTED, OPEN APPROACH: ICD-10-PCS | Performed by: STUDENT IN AN ORGANIZED HEALTH CARE EDUCATION/TRAINING PROGRAM

## 2024-07-08 PROCEDURE — 99222 1ST HOSP IP/OBS MODERATE 55: CPT | Performed by: HOSPITALIST

## 2024-07-08 DEVICE — ASYMMETRIC PATELLA
Type: IMPLANTABLE DEVICE | Site: KNEE | Status: FUNCTIONAL
Brand: TRIATHLON

## 2024-07-08 DEVICE — TIBIAL BEARING INSERT - CS
Type: IMPLANTABLE DEVICE | Site: KNEE | Status: FUNCTIONAL
Brand: TRIATHLON

## 2024-07-08 DEVICE — CRUCIATE RETAINING FEMORAL
Type: IMPLANTABLE DEVICE | Site: KNEE | Status: FUNCTIONAL
Brand: TRIATHLON

## 2024-07-08 DEVICE — IMPLANTABLE DEVICE
Type: IMPLANTABLE DEVICE | Site: KNEE | Status: FUNCTIONAL
Brand: REFOBACIN® BONE CEMENT R

## 2024-07-08 DEVICE — UNIVERSAL TIBIAL BASEPLATE
Type: IMPLANTABLE DEVICE | Site: KNEE | Status: FUNCTIONAL
Brand: TRIATHLON

## 2024-07-08 RX ORDER — MORPHINE SULFATE 4 MG/ML
2 INJECTION, SOLUTION INTRAMUSCULAR; INTRAVENOUS EVERY 10 MIN PRN
Status: DISCONTINUED | OUTPATIENT
Start: 2024-07-08 | End: 2024-07-08 | Stop reason: HOSPADM

## 2024-07-08 RX ORDER — POLYETHYLENE GLYCOL 3350 17 G/17G
17 POWDER, FOR SOLUTION ORAL DAILY PRN
Status: DISCONTINUED | OUTPATIENT
Start: 2024-07-08 | End: 2024-07-10

## 2024-07-08 RX ORDER — MYCOPHENOLATE MOFETIL 500 MG/1
500 TABLET ORAL DAILY
Status: DISCONTINUED | OUTPATIENT
Start: 2024-07-09 | End: 2024-07-08

## 2024-07-08 RX ORDER — EZETIMIBE 10 MG/1
10 TABLET ORAL NIGHTLY
Status: DISCONTINUED | OUTPATIENT
Start: 2024-07-08 | End: 2024-07-10

## 2024-07-08 RX ORDER — TRANEXAMIC ACID 10 MG/ML
INJECTION, SOLUTION INTRAVENOUS AS NEEDED
Status: DISCONTINUED | OUTPATIENT
Start: 2024-07-08 | End: 2024-07-08 | Stop reason: SURG

## 2024-07-08 RX ORDER — FERROUS SULFATE 325(65) MG
325 TABLET, DELAYED RELEASE (ENTERIC COATED) ORAL
Status: DISCONTINUED | OUTPATIENT
Start: 2024-07-09 | End: 2024-07-10

## 2024-07-08 RX ORDER — MORPHINE SULFATE 4 MG/ML
4 INJECTION, SOLUTION INTRAMUSCULAR; INTRAVENOUS EVERY 10 MIN PRN
Status: DISCONTINUED | OUTPATIENT
Start: 2024-07-08 | End: 2024-07-08 | Stop reason: HOSPADM

## 2024-07-08 RX ORDER — ASPIRIN 81 MG/1
81 TABLET ORAL 2 TIMES DAILY
Status: DISCONTINUED | OUTPATIENT
Start: 2024-07-08 | End: 2024-07-10

## 2024-07-08 RX ORDER — DEXAMETHASONE SODIUM PHOSPHATE 10 MG/ML
INJECTION, SOLUTION INTRAMUSCULAR; INTRAVENOUS
Status: COMPLETED | OUTPATIENT
Start: 2024-07-08 | End: 2024-07-08

## 2024-07-08 RX ORDER — DIPHENHYDRAMINE HYDROCHLORIDE 50 MG/ML
25 INJECTION INTRAMUSCULAR; INTRAVENOUS ONCE AS NEEDED
Status: ACTIVE | OUTPATIENT
Start: 2024-07-08 | End: 2024-07-08

## 2024-07-08 RX ORDER — CETIRIZINE HYDROCHLORIDE 10 MG/1
10 TABLET ORAL NIGHTLY
Status: DISCONTINUED | OUTPATIENT
Start: 2024-07-09 | End: 2024-07-10

## 2024-07-08 RX ORDER — DOCUSATE SODIUM 100 MG/1
100 CAPSULE, LIQUID FILLED ORAL 2 TIMES DAILY
Status: DISCONTINUED | OUTPATIENT
Start: 2024-07-08 | End: 2024-07-10

## 2024-07-08 RX ORDER — ONDANSETRON 2 MG/ML
4 INJECTION INTRAMUSCULAR; INTRAVENOUS EVERY 6 HOURS PRN
Status: DISCONTINUED | OUTPATIENT
Start: 2024-07-08 | End: 2024-07-10

## 2024-07-08 RX ORDER — SENNOSIDES 8.6 MG
17.2 TABLET ORAL NIGHTLY
Status: DISCONTINUED | OUTPATIENT
Start: 2024-07-08 | End: 2024-07-10

## 2024-07-08 RX ORDER — BUPROPION HYDROCHLORIDE 100 MG/1
100 TABLET, EXTENDED RELEASE ORAL DAILY
Status: DISCONTINUED | OUTPATIENT
Start: 2024-07-09 | End: 2024-07-10

## 2024-07-08 RX ORDER — ACETAMINOPHEN 500 MG
1000 TABLET ORAL EVERY 6 HOURS
Status: DISCONTINUED | OUTPATIENT
Start: 2024-07-08 | End: 2024-07-10

## 2024-07-08 RX ORDER — OXYCODONE HYDROCHLORIDE 5 MG/1
5 TABLET ORAL EVERY 4 HOURS PRN
Status: DISCONTINUED | OUTPATIENT
Start: 2024-07-08 | End: 2024-07-10

## 2024-07-08 RX ORDER — MYCOPHENOLATE MOFETIL 250 MG/1
500 CAPSULE ORAL 2 TIMES DAILY
Status: DISCONTINUED | OUTPATIENT
Start: 2024-07-08 | End: 2024-07-10

## 2024-07-08 RX ORDER — MORPHINE SULFATE 10 MG/ML
6 INJECTION, SOLUTION INTRAMUSCULAR; INTRAVENOUS EVERY 10 MIN PRN
Status: DISCONTINUED | OUTPATIENT
Start: 2024-07-08 | End: 2024-07-08 | Stop reason: HOSPADM

## 2024-07-08 RX ORDER — HYDROMORPHONE HYDROCHLORIDE 1 MG/ML
0.6 INJECTION, SOLUTION INTRAMUSCULAR; INTRAVENOUS; SUBCUTANEOUS EVERY 5 MIN PRN
Status: DISCONTINUED | OUTPATIENT
Start: 2024-07-08 | End: 2024-07-08 | Stop reason: HOSPADM

## 2024-07-08 RX ORDER — OXYBUTYNIN CHLORIDE 10 MG/1
10 TABLET, EXTENDED RELEASE ORAL DAILY
Status: DISCONTINUED | OUTPATIENT
Start: 2024-07-09 | End: 2024-07-10

## 2024-07-08 RX ORDER — DIPHENHYDRAMINE HYDROCHLORIDE 50 MG/ML
12.5 INJECTION INTRAMUSCULAR; INTRAVENOUS EVERY 4 HOURS PRN
Status: DISCONTINUED | OUTPATIENT
Start: 2024-07-08 | End: 2024-07-10

## 2024-07-08 RX ORDER — DIPHENHYDRAMINE HCL 25 MG
25 CAPSULE ORAL EVERY 4 HOURS PRN
Status: DISCONTINUED | OUTPATIENT
Start: 2024-07-08 | End: 2024-07-10

## 2024-07-08 RX ORDER — KETOROLAC TROMETHAMINE 15 MG/ML
15 INJECTION, SOLUTION INTRAMUSCULAR; INTRAVENOUS EVERY 6 HOURS
Status: COMPLETED | OUTPATIENT
Start: 2024-07-08 | End: 2024-07-10

## 2024-07-08 RX ORDER — SODIUM CHLORIDE, SODIUM LACTATE, POTASSIUM CHLORIDE, CALCIUM CHLORIDE 600; 310; 30; 20 MG/100ML; MG/100ML; MG/100ML; MG/100ML
INJECTION, SOLUTION INTRAVENOUS CONTINUOUS
Status: DISCONTINUED | OUTPATIENT
Start: 2024-07-08 | End: 2024-07-08 | Stop reason: HOSPADM

## 2024-07-08 RX ORDER — HYDROMORPHONE HYDROCHLORIDE 1 MG/ML
0.2 INJECTION, SOLUTION INTRAMUSCULAR; INTRAVENOUS; SUBCUTANEOUS EVERY 5 MIN PRN
Status: DISCONTINUED | OUTPATIENT
Start: 2024-07-08 | End: 2024-07-08 | Stop reason: HOSPADM

## 2024-07-08 RX ORDER — LISINOPRIL 10 MG/1
10 TABLET ORAL DAILY
Status: DISCONTINUED | OUTPATIENT
Start: 2024-07-09 | End: 2024-07-10

## 2024-07-08 RX ORDER — LIDOCAINE HYDROCHLORIDE 10 MG/ML
INJECTION, SOLUTION EPIDURAL; INFILTRATION; INTRACAUDAL; PERINEURAL AS NEEDED
Status: DISCONTINUED | OUTPATIENT
Start: 2024-07-08 | End: 2024-07-08 | Stop reason: SURG

## 2024-07-08 RX ORDER — SODIUM CHLORIDE 9 MG/ML
INJECTION, SOLUTION INTRAVENOUS CONTINUOUS
Status: DISCONTINUED | OUTPATIENT
Start: 2024-07-08 | End: 2024-07-10

## 2024-07-08 RX ORDER — ACETAMINOPHEN 500 MG
1000 TABLET ORAL ONCE
Status: COMPLETED | OUTPATIENT
Start: 2024-07-08 | End: 2024-07-08

## 2024-07-08 RX ORDER — HYDROMORPHONE HYDROCHLORIDE 1 MG/ML
0.4 INJECTION, SOLUTION INTRAMUSCULAR; INTRAVENOUS; SUBCUTANEOUS EVERY 5 MIN PRN
Status: DISCONTINUED | OUTPATIENT
Start: 2024-07-08 | End: 2024-07-08 | Stop reason: HOSPADM

## 2024-07-08 RX ORDER — ENEMA 19; 7 G/133ML; G/133ML
1 ENEMA RECTAL ONCE AS NEEDED
Status: DISCONTINUED | OUTPATIENT
Start: 2024-07-08 | End: 2024-07-10

## 2024-07-08 RX ORDER — BISACODYL 10 MG
10 SUPPOSITORY, RECTAL RECTAL
Status: DISCONTINUED | OUTPATIENT
Start: 2024-07-08 | End: 2024-07-10

## 2024-07-08 RX ORDER — ROPIVACAINE HYDROCHLORIDE 5 MG/ML
INJECTION, SOLUTION EPIDURAL; INFILTRATION; PERINEURAL
Status: COMPLETED | OUTPATIENT
Start: 2024-07-08 | End: 2024-07-08

## 2024-07-08 RX ORDER — ACETAMINOPHEN 500 MG
1000 TABLET ORAL ONCE
Status: DISCONTINUED | OUTPATIENT
Start: 2024-07-08 | End: 2024-07-08 | Stop reason: HOSPADM

## 2024-07-08 RX ORDER — METOCLOPRAMIDE HYDROCHLORIDE 5 MG/ML
10 INJECTION INTRAMUSCULAR; INTRAVENOUS EVERY 8 HOURS PRN
Status: DISCONTINUED | OUTPATIENT
Start: 2024-07-08 | End: 2024-07-10

## 2024-07-08 RX ORDER — SODIUM CHLORIDE, SODIUM LACTATE, POTASSIUM CHLORIDE, CALCIUM CHLORIDE 600; 310; 30; 20 MG/100ML; MG/100ML; MG/100ML; MG/100ML
INJECTION, SOLUTION INTRAVENOUS CONTINUOUS
Status: DISCONTINUED | OUTPATIENT
Start: 2024-07-08 | End: 2024-07-08 | Stop reason: ALTCHOICE

## 2024-07-08 RX ORDER — CELECOXIB 200 MG/1
400 CAPSULE ORAL ONCE
Status: COMPLETED | OUTPATIENT
Start: 2024-07-08 | End: 2024-07-08

## 2024-07-08 RX ORDER — BUPIVACAINE HYDROCHLORIDE 7.5 MG/ML
INJECTION, SOLUTION INTRASPINAL
Status: COMPLETED | OUTPATIENT
Start: 2024-07-08 | End: 2024-07-08

## 2024-07-08 RX ORDER — LIDOCAINE HYDROCHLORIDE 10 MG/ML
INJECTION, SOLUTION INFILTRATION; PERINEURAL
Status: COMPLETED | OUTPATIENT
Start: 2024-07-08 | End: 2024-07-08

## 2024-07-08 RX ORDER — NALOXONE HYDROCHLORIDE 0.4 MG/ML
0.08 INJECTION, SOLUTION INTRAMUSCULAR; INTRAVENOUS; SUBCUTANEOUS AS NEEDED
Status: DISCONTINUED | OUTPATIENT
Start: 2024-07-08 | End: 2024-07-08 | Stop reason: HOSPADM

## 2024-07-08 RX ADMIN — LIDOCAINE HYDROCHLORIDE 5 ML: 10 INJECTION, SOLUTION INFILTRATION; PERINEURAL at 12:42:00

## 2024-07-08 RX ADMIN — LIDOCAINE HYDROCHLORIDE 5 ML: 10 INJECTION, SOLUTION INFILTRATION; PERINEURAL at 12:55:00

## 2024-07-08 RX ADMIN — SODIUM CHLORIDE, SODIUM LACTATE, POTASSIUM CHLORIDE, CALCIUM CHLORIDE: 600; 310; 30; 20 INJECTION, SOLUTION INTRAVENOUS at 14:58:00

## 2024-07-08 RX ADMIN — TRANEXAMIC ACID 1000 MG: 10 INJECTION, SOLUTION INTRAVENOUS at 14:35:00

## 2024-07-08 RX ADMIN — DEXAMETHASONE SODIUM PHOSPHATE 10 MG: 10 INJECTION, SOLUTION INTRAMUSCULAR; INTRAVENOUS at 12:42:00

## 2024-07-08 RX ADMIN — SODIUM CHLORIDE, SODIUM LACTATE, POTASSIUM CHLORIDE, CALCIUM CHLORIDE: 600; 310; 30; 20 INJECTION, SOLUTION INTRAVENOUS at 12:54:00

## 2024-07-08 RX ADMIN — TRANEXAMIC ACID 1000 MG: 10 INJECTION, SOLUTION INTRAVENOUS at 13:00:00

## 2024-07-08 RX ADMIN — LIDOCAINE HYDROCHLORIDE 25 MG: 10 INJECTION, SOLUTION EPIDURAL; INFILTRATION; INTRACAUDAL; PERINEURAL at 13:00:00

## 2024-07-08 RX ADMIN — BUPIVACAINE HYDROCHLORIDE 1.4 ML: 7.5 INJECTION, SOLUTION INTRASPINAL at 12:55:00

## 2024-07-08 RX ADMIN — ROPIVACAINE HYDROCHLORIDE 20 ML: 5 INJECTION, SOLUTION EPIDURAL; INFILTRATION; PERINEURAL at 12:42:00

## 2024-07-08 NOTE — ANESTHESIA PREPROCEDURE EVALUATION
Anesthesia PreOp Note    HPI:     Jaclyn Block is a 73 year old female who presents for preoperative consultation requested by: Behery, Omar Atef, MD    Date of Surgery: 7/8/2024    Procedure(s):  Left total knee arthroplasty  Indication: Left knee osteoarthritis    Relevant Problems   No relevant active problems       NPO:  Last Liquid Consumption Date: 07/08/24  Last Liquid Consumption Time: 0000  Last Solid Consumption Date: 07/08/24  Last Solid Consumption Time: 0000  Last Liquid Consumption Date: 07/08/24          History Review:  Patient Active Problem List    Diagnosis Date Noted    Osteoarthritis of left knee, unspecified osteoarthritis type 07/07/2024    Anemia 08/21/2023    Pure hyperglyceridemia 08/21/2023    General medical examination 08/21/2023    Perianal cyst 11/09/2022    Age-related nuclear cataract of both eyes 07/16/2022    Chronic diarrhea 05/17/2021    Diarrhea 04/14/2021    Gastroesophageal reflux disease 04/14/2021    Pulmonary emphysema (HCC) 05/08/2020    Acute sinusitis 12/19/2019    Sore throat 12/19/2019    Chalazion of left lower eyelid 06/22/2019    Macular degeneration of both eyes 07/21/2018    Coronary artery calcification 03/27/2018    Osteopenia 08/10/2017    Need for shingles vaccine - cannot receive 08/01/2017    Urinary incontinence 09/01/2016    Lung nodule - repeat 3/2019 03/22/2016    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    S/P knee replacement 06/06/2014    Knee pain 08/30/2013    Screening for cardiovascular condition 08/30/2013    Spinal stenosis of lumbar region 08/30/2013    JAYSON (obstructive sleep apnea) 07/11/2012    Hyperlipidemia 06/07/2011    Osteoporosis 06/07/2011       Past Medical History:    Abdominal hernia    Abdominal pain    Anemia    Anxiety    Arthritis    Atypical mole    Autoimmune disorder (HCC)    Back pain    Bad breath    Blood in urine    Cataract    Chest pain on exertion    Chronic cough    Depression    Diarrhea, unspecified    Diverticulosis of  large intestine    Dizziness    Easy bruising    Esophageal reflux    Eye disease    Fatigue    Am caregiver 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    Feeling lonely    Flatulence/gas pain/belching    Food intolerance    Frequent urination    Hearing loss    Heartburn    Reflux wakes me at night    High blood pressure    High cholesterol    History of blood transfusion    History of depression    History of mental disorder    Diagnosed schizo affective    HYPERLIPIDEMIA    HYPERTRIGLYCERIDEMIA    Incontinence    Irregular bowel habits    Leaking of urine    Since maybe jan 2019    Low back pain    intermittent    Nausea    Night sweats    JAYSON (obstructive sleep apnea)    AHI 50/ RDI 50/ REM AHI 34/ SUPINE / SaO2 albert 86%/ CPAP 10/ HTR    OSTEOPOROSIS    Pain in joints    Presence of other cardiac implants and grafts    Rash    Take mycophenolate for hives    Schizoaffective disorder (HCC)    Shortness of breath    Sleep apnea    Sleep disturbance    Stress    Urge incontinence    Urticaria    Visual impairment    Wears glasses    Weight gain       Past Surgical History:   Procedure Laterality Date    Appendectomy      Cataract      Knee replacement surgery      right    Tonsillectomy      Total knee replacement Right        Medications Prior to Admission   Medication Sig Dispense Refill Last Dose    ezetimibe 10 MG Oral Tab    7/8/2024    levocetirizine 5 MG Oral Tab Take 1 tablet (5 mg total) by mouth nightly.   7/8/2024    Melatonin 5 MG Oral Tab nightly as needed.   Past Week    famotidine 40 MG Oral Tab Take 1 tablet (40 mg total) by mouth daily. (Patient taking differently: Take 1 tablet (40 mg total) by mouth as needed.) 90 tablet 3 7/7/2024    oxybutynin ER 10 MG Oral Tablet 24 Hr Take 1 tablet (10 mg total) by mouth daily. Need office visit prior to additional refills. 90 tablet 0 7/8/2024    MYCOPHENOLATE MOFETIL 500 MG Oral Tab TAKE ONE TABLET BY MOUTH ONE TIME DAILY 90 tablet 1 Past Week    lisinopril 10 MG Oral Tab Take 1  tablet (10 mg total) by mouth daily.   7/7/2024    alendronate 35 MG Oral Tab Take 1 tablet (35 mg total) by mouth every 7 days. 12 tablet 3 Past Week    buPROPion HCl ER, SR, 100 MG Oral Tablet 12 Hr Take 1 tablet (100 mg total) by mouth 2 (two) times daily. (Patient taking differently: Take 1 tablet (100 mg total) by mouth daily.) 180 tablet 2 7/8/2024    Multiple Vitamins-Minerals (ICAPS AREDS FORMULA) Oral Tab Take by mouth.   Past Week    S-Adenosylmethionine (XOCHITL-E OR) Take 1 capsule by mouth daily.     Past Week    aspirin 81 MG Oral Chew Tab Chew 1 tablet (81 mg total) by mouth daily.   7/2/2024    Calcium Carbonate-Vitamin D 500-125 MG-UNIT Oral Tab Take 1 tablet by mouth daily.     Past Week    Glucosamine-Chondroit-Vit C-Mn (GLUCOSAMINE CHONDR 500 COMPLEX OR) Take 1 tablet by mouth daily.     Past Week    ferrous sulfate 325 (65 FE) MG Oral Tab EC Take 1 tablet (325 mg total) by mouth daily with breakfast.   7/7/2024    omega-3 fatty acids 1000 MG Oral Cap Take 1,000 mg by mouth daily.   Past Week    Coenzyme Q-10 100 MG Oral Cap Take 100 mg by mouth daily.   Past Week    budeson-glycopyrrol-formoterol 160-9-4.8 MCG/ACT Inhalation Aerosol Inhale 2 puffs into the lungs as needed.   Unknown     Current Facility-Administered Medications Ordered in Epic   Medication Dose Route Frequency Provider Last Rate Last Admin    lactated ringers infusion   Intravenous Continuous Behery, Omar Atef, MD 20 mL/hr at 07/08/24 1104 New Bag at 07/08/24 1104    acetaminophen (Tylenol Extra Strength) tab 1,000 mg  1,000 mg Oral Once Behery, Omar Atef, MD        ceFAZolin (Ancef) 2g in 10mL IV syringe premix  2 g Intravenous Once Behery, Omar Atef, MD         No current [facility]-ordered outpatient medications on file.       Allergies   Allergen Reactions    Statins Myopathy    Latex RASH       Family History   Problem Relation Age of Onset    Other (Other) Father         ESRD on HD    Prostate Cancer Father         Age 79    Alcohol  and Other Disorders Associated Father         functiniong alcoholic    Cancer Paternal Grandmother 93        breast    Breast Cancer Paternal Grandmother 90        dx age 90    Heart Disorder Maternal Grandfather     Heart Attack Maternal Grandfather         Took nitro glycerine under tongue    Heart Disorder Other         mat uncle - MI    Breast Cancer Maternal Cousin Female 55        age at dx 55    Breast Cancer Sister         Age 67    Hypertension Sister         Age 60    Stroke Sister         Mini strokes age 45    Mental Disorder Sister         Since childhood    Diabetes Sister         type 2 managed    Heart Attack Maternal Uncle          age 52    Alcohol and Other Disorders Associated Brother         gambling     Social History     Socioeconomic History    Marital status:    Tobacco Use    Smoking status: Former     Current packs/day: 0.00     Average packs/day: 3.0 packs/day for 47.3 years (141.9 ttl pk-yrs)     Types: Cigarettes     Start date: 1971     Quit date: 2018     Years since quittin.1    Smokeless tobacco: Never    Tobacco comments:     Haven't touched it since    Vaping Use    Vaping status: Never Used   Substance and Sexual Activity    Alcohol use: Yes     Alcohol/week: 2.0 standard drinks of alcohol     Types: 2 Glasses of wine per week     Comment: Socially    Drug use: No    Sexual activity: Never       Available pre-op labs reviewed.             Vital Signs:  Body mass index is 29.05 kg/m².   height is 1.6 m (5' 3\") and weight is 74.4 kg (164 lb). Her oral temperature is 98.1 °F (36.7 °C). Her blood pressure is 150/77 and her pulse is 87. Her respiration is 16 and oxygen saturation is 96%.   Vitals:    24 1141 24 1028   BP:  150/77   Pulse:  87   Resp:  16   Temp:  98.1 °F (36.7 °C)   TempSrc:  Oral   SpO2:  96%   Weight: 69.9 kg (154 lb) 74.4 kg (164 lb)   Height: 1.6 m (5' 3\")         Anesthesia Evaluation      Airway   Mallampati: III  TM  distance: >3 FB  Neck ROM: full  Dental - Dentition appears grossly intact     Pulmonary - normal exam   (+) COPD, shortness of breath, sleep apnea  Cardiovascular - normal exam  (+) hypertension    Neuro/Psych    (+)  anxiety/panic attacks,  depression      GI/Hepatic/Renal    (+) GERD    Endo/Other    Abdominal  - normal exam                 Anesthesia Plan:   ASA:  2  Plan:   Regional and spinal  Plan Comments: Neuroaxial anesthesia was explained in details to the patient, addressing the technique, intended outcome and known adverse events namely spinal or epidural bleeding, infection, post dural puncture headaches, complete spinal block with resulting cardiovascular and respiratory failure, block failure and or need for multiple attempts or switching to general anesthesia.         I have informed Jaclyn Block and/or legal guardian or family member of the nature of the anesthetic plan, benefits, risks including possible dental damage if relevant, major complications, and any alternative forms of anesthetic management.   All of the patient's questions were answered to the best of my ability. The patient desires the anesthetic management as planned.  Sean Ward MD  7/8/2024 12:08 PM  Present on Admission:  **None**

## 2024-07-08 NOTE — ANESTHESIA PROCEDURE NOTES
Peripheral Block    Date/Time: 7/8/2024 12:42 PM    Performed by: Beltran Ziegler MD  Authorized by: Beltran Ziegler MD      General Information and Staff    Start Time:  7/8/2024 12:42 PM  End Time:  7/8/2024 12:48 PM  Anesthesiologist:  Beltran Ziegler MD  Performed by:  Anesthesiologist  Patient Location:  PACU      Site Identification: real time ultrasound guided and image stored and retrievable      Reason for Block: at surgeon's request and post-op pain management    Preanesthetic Checklist: 2 patient identifers, IV checked, risks and benefits discussed, monitors and equipment checked, pre-op evaluation, timeout performed, anesthesia consent and sterile technique used      Procedure Details    Patient Position:  Supine  Prep: ChloraPrep    Monitoring:  Cardiac monitor  Block Type:  Saphenous  Injection Technique:  Single-shot    Needle    Needle Type:  Echogenic  Needle Gauge:  22 G  Needle Localization:  Ultrasound guidance              Assessment    Injection Assessment:  Good spread noted, incremental injection, low pressure, local visualized surrounding nerve on ultrasound, negative aspiration for heme and no pain on injection  Paresthesia Pain:  None  Heart Rate Change: No        Medications  7/8/2024 12:42 PM  lidocaine injection 1% - Intradermal   5 mL - 7/8/2024 12:42:00 PM  dexamethasone (DECADRON) PF injection 10 mg/ml - Injection   10 mg - 7/8/2024 12:42:00 PM  ropivacaine (NAROPIN) injection 0.5% - Infiltration   20 mL - 7/8/2024 12:42:00 PM    Additional Comments

## 2024-07-08 NOTE — CONSULTS
[facility]    PATIENT'S NAME: JESSICA ACOSTA   ATTENDING PHYSICIAN: Omar Behery, MD   CONSULTING PHYSICIAN: Dunia Butler MD   PATIENT ACCOUNT#:   [id number]    LOCATION:   Room 5 A [facility]  MEDICAL RECORD #:   [id number]       YOB: 1950  ADMISSION DATE:       07/08/2024      CONSULT DATE:  07/08/2024    REPORT OF CONSULTATION      REASON FOR ADMISSION:  Post left total knee arthroplasty.    HISTORY OF PRESENT ILLNESS:  Patient is a 73-year-old  female with chronic left knee pain, underlying severe primary osteoarthritis, failed outpatient conservative medical management options, scheduled today for above-mentioned procedure by her orthopedic surgeon, Dr. Omar Behery.  Preoperatively, she had adductor canal and spinal block.  Postoperatively, transferred to PACU for further monitoring.    PAST MEDICAL HISTORY:  Generalized osteoarthritis, hyperlipidemia, hypertension, gastroesophageal reflux disease, obstructive sleep apnea, degenerative joint disease of lumbar spine, chronic obstructive pulmonary disease, autoimmune urticaria, osteoporosis.    PAST SURGICAL HISTORY:  Appendectomy, cholecystectomy, right total knee arthroplasty.    MEDICATIONS:  Please see medication reconciliation list.    ALLERGIES:  Latex, side effects to statins.    SOCIAL HISTORY:  Ex-tobacco user.  Social alcohol.  No drug use.  Lives with her family.  Independent for basic activities of daily living.    FAMILY HISTORY:  Father had endstage renal disease and prostate cancer.    REVIEW OF SYSTEMS:  Currently, resting in bed.  No left knee pain.  No chest pain.  No shortness of breath.  Other 12-point review of systems is negative.      PHYSICAL EXAMINATION:    GENERAL:  Alert and oriented to time, place, and person.  No acute distress.  VITAL SIGNS:  Temperature 97.9, pulse 69, respiratory rate 14, blood pressure 108/96, pulse ox 98% on room air.   HEENT:  Atraumatic.  Oropharynx clear.  Moist mucous membranes.   Ears, Nose:  Normal.  Eyes:  Anicteric sclerae.   NECK:  Supple.  No lymphadenopathy.  Trachea midline.  Full range of motion.  LUNGS:  Clear to auscultation bilaterally.  Normal respiratory effort.  HEART:  Regular rate, rhythm.  S1 and S2 auscultated.  No murmur.  ABDOMEN:  Soft, nondistended.  No tenderness.  Positive bowel sounds.  EXTREMITIES:  Left knee dressing.  No leg edema, clubbing, or cyanosis.  NEUROLOGIC:  No acute focal defect.    ASSESSMENT AND PLAN:    1.   Left knee primary osteoarthritis, status post left total knee arthroplasty.  Spinal block.  Pain control.  Neurovascular checks.  Aspirin for DVT prophylaxis.  Physical and occupational therapy.  2.   Obstructive sleep apnea.  Apply obstructive sleep apnea protocol.  Monitor.  3.   Essential hypertension.  Continue home medication and monitor.  4.   Hyperlipidemia.  Continue home medications.  5.   Autoimmune urticaria.  Continue home medications.  6.   Chronic obstructive pulmonary disease.  Continue home medications.  Monitor respiratory status.    Dictated By Dunia Butler MD  d: 07/08/2024 16:10:16  t: 07/08/2024 17:48:52  Job [id number]  FB/    cc: Omar Behery, MD

## 2024-07-08 NOTE — ANESTHESIA POSTPROCEDURE EVALUATION
Patient: Jaclyn Block    Procedure Summary       Date: 07/08/24 Room / Location: [facility] MAIN OR  / [facility] MAIN OR    Anesthesia Start: 1253 Anesthesia Stop: 1530    Procedure: Left total knee arthroplasty (Left: Knee) Diagnosis:       Primary osteoarthritis of left knee      (Left knee osteoarthritis)    Surgeons: Behery, Omar Atef, MD Anesthesiologist: Wei Emanuel MD    Anesthesia Type: spinal ASA Status: 2            Anesthesia Type: spinal    Vitals Value Taken Time   /69 07/08/24 1528   Temp 97.9 °F (36.6 °C) 07/08/24 1530   Pulse 73 07/08/24 1530   Resp 19 07/08/24 1530   SpO2 99 % 07/08/24 1530   Vitals shown include unfiled device data.    [facility] AN Post Evaluation:   Patient Evaluated in PACU  Patient Participation: complete - patient participated  Level of Consciousness: awake and alert  Pain Management: adequate  Airway Patency:patent  Dental exam unchanged from preop  Yes    Nausea/Vomiting: none  Cardiovascular Status: acceptable  Respiratory Status: acceptable  Postoperative Hydration acceptable      WEI EMANUEL MD  7/8/2024 3:31 PM

## 2024-07-08 NOTE — ANESTHESIA PROCEDURE NOTES
Spinal Block    Date/Time: 7/8/2024 12:55 PM    Performed by: Beltran Ziegler MD  Authorized by: Beltran Ziegler MD      General Information and Staff    Start Time:  7/8/2024 12:55 PM  End Time:  7/8/2024 12:55 PM  Anesthesiologist:  Beltran Ziegler MD  Performed by:  Anesthesiologist  Preanesthetic Checklist: patient identified, IV checked, risks and benefits discussed, monitors and equipment checked, pre-op evaluation, timeout performed, anesthesia consent and sterile technique used      Procedure Details    Patient Position:  Sitting  Prep: ChloraPrep    Monitoring:  Cardiac monitor  Approach:  Midline  Location:  L3-4  Injection Technique:  Single-shot    Needle    Needle Type:  Pencil-tip  Needle Gauge:  24 G  Needle Length:  3.5 in    Assessment    Sensory Level:   Events: clear CSF, CSF aspirated, well tolerated and blood negative      Additional Comments

## 2024-07-08 NOTE — H&P
[facility]  part of [facility]    History and Physical    Jaclyn Block Patient Status:  Outpatient in a Bed    1950 MRN [id number]   Location [facility] OPERATING ROOM Attending Behery, Omar Atef, MD   Hosp Day # 0 PCP Zoë Erwin MD     Date:  2024  Date of Admission:  2024    History provided by:patient  HPI:   No chief complaint on file.    HPI  This is a pleasant 74yo lady who is presenting for evaluation of left knee pain secondary to advanced OA. Referred by Dr. Durán.    Patient has a history of chronic persistent left knee pain. This is not related to injury. The pain started several years ago. Pain is located medially and anteriorly, without radiation. This knee pain is associated with weight bearing activity.  Patient denies associated numbness/tingling in the left lower extremity.   The patient as tried non-operative treatment with NSAIDs, PT, injections. without durable pain relief.     Patient can currently only walk 1-2 blocks, and activities of daily living are limited by significant left knee pain.    PMH: idiopathic urticaria, on mycopholate per rheumatology, CAD (follows with cardiology).   PSH: right TKA done years ago - no issues presently.    Patient is retired. Lives alone in [place]. No family around to help. Former smoker, quit 6 years ago approximately.  Goals of treatment: pain relief.  History     Past Medical History:    Abdominal hernia    Abdominal pain    Anemia    Anxiety    Arthritis    Atypical mole    Autoimmune disorder (HCC)    Back pain    Bad breath    Blood in urine    Cataract    Chest pain on exertion    Chronic cough    Depression    Diarrhea, unspecified    Diverticulosis of large intestine    Dizziness    Easy bruising    Esophageal reflux    Eye disease    Fatigue    Am caregiver for sister    Feeling lonely    Flatulence/gas pain/belching    Food intolerance    Frequent urination    Hearing loss    Heartburn     Reflux wakes me at night    High blood pressure    High cholesterol    History of blood transfusion    History of depression    History of mental disorder    Diagnosed schizo affective    HYPERLIPIDEMIA    HYPERTRIGLYCERIDEMIA    Incontinence    Irregular bowel habits    Leaking of urine    Since maybe 2019    Low back pain    intermittent    Nausea    Night sweats    JAYSON (obstructive sleep apnea)    AHI 50/ RDI 50/ REM AHI 34/ SUPINE / SaO2 albert 86%/ CPAP 10/ HTR    OSTEOPOROSIS    Pain in joints    Presence of other cardiac implants and grafts    Rash    Take mycophenolate for hives    Schizoaffective disorder (HCC)    Shortness of breath    Sleep apnea    Sleep disturbance    Stress    Urge incontinence    Urticaria    Visual impairment    Wears glasses    Weight gain     Past Surgical History:   Procedure Laterality Date    Appendectomy      Cataract      Knee replacement surgery      right    Tonsillectomy      Total knee replacement Right      Family History   Problem Relation Age of Onset    Other (Other) Father         ESRD on HD    Prostate Cancer Father         Age 79    Alcohol and Other Disorders Associated Father         functiniong alcoholic    Cancer Paternal Grandmother 93        breast    Breast Cancer Paternal Grandmother 90        dx age 90    Heart Disorder Maternal Grandfather     Heart Attack Maternal Grandfather         Took nitro glycerine under tongue    Heart Disorder Other         mat uncle - MI    Breast Cancer Maternal Cousin Female 55        age at dx 55    Breast Cancer Sister         Age 67    Hypertension Sister         Age 60    Stroke Sister         Mini strokes age 45    Mental Disorder Sister         Since childhood    Diabetes Sister         type 2 managed    Heart Attack Maternal Uncle          age 52    Alcohol and Other Disorders Associated Brother         gambling     Social History:  Social History     Socioeconomic History    Marital status:     Tobacco Use    Smoking status: Former     Current packs/day: 0.00     Average packs/day: 3.0 packs/day for 47.3 years (141.9 ttl pk-yrs)     Types: Cigarettes     Start date: 1971     Quit date: 2018     Years since quittin.1    Smokeless tobacco: Never    Tobacco comments:     Haven't touched it since 2018   Vaping Use    Vaping status: Never Used   Substance and Sexual Activity    Alcohol use: Yes     Alcohol/week: 2.0 standard drinks of alcohol     Types: 2 Glasses of wine per week     Comment: Socially    Drug use: No    Sexual activity: Never     Social Determinants of Health     Food Insecurity: Low Risk  (2023)    Received from [facility]    Food Insecurity     Have there been times that your food ran out, and you didn't have money to get more?: No     Are there times that you worry that this might happen?: No   Transportation Needs: Low Risk  (2023)    Received from [facility]    Transportation Needs     Do you have trouble getting transportation to medical appointments?: No     Allergies/Medications:   Allergies:   Allergies   Allergen Reactions    Statins Myopathy    Latex RASH     Medications Prior to Admission   Medication Sig    ezetimibe 10 MG Oral Tab     levocetirizine 5 MG Oral Tab Take 1 tablet (5 mg total) by mouth nightly.    Melatonin 5 MG Oral Tab nightly as needed.    famotidine 40 MG Oral Tab Take 1 tablet (40 mg total) by mouth daily. (Patient taking differently: Take 1 tablet (40 mg total) by mouth as needed.)    oxybutynin ER 10 MG Oral Tablet 24 Hr Take 1 tablet (10 mg total) by mouth daily. Need office visit prior to additional refills.    MYCOPHENOLATE MOFETIL 500 MG Oral Tab TAKE ONE TABLET BY MOUTH ONE TIME DAILY    lisinopril 10 MG Oral Tab Take 1 tablet (10 mg total) by mouth daily.    alendronate 35 MG Oral Tab Take 1 tablet (35 mg total) by mouth every 7 days.    buPROPion HCl ER, SR, 100 MG Oral Tablet 12 Hr Take  1 tablet (100 mg total) by mouth 2 (two) times daily. (Patient taking differently: Take 1 tablet (100 mg total) by mouth daily.)    Multiple Vitamins-Minerals (ICAPS AREDS FORMULA) Oral Tab Take by mouth.    S-Adenosylmethionine (XOCHITL-E OR) Take 1 capsule by mouth daily.      aspirin 81 MG Oral Chew Tab Chew 1 tablet (81 mg total) by mouth daily.    Calcium Carbonate-Vitamin D 500-125 MG-UNIT Oral Tab Take 1 tablet by mouth daily.      Glucosamine-Chondroit-Vit C-Mn (GLUCOSAMINE CHONDR 500 COMPLEX OR) Take 1 tablet by mouth daily.      ferrous sulfate 325 (65 FE) MG Oral Tab EC Take 1 tablet (325 mg total) by mouth daily with breakfast.    omega-3 fatty acids 1000 MG Oral Cap Take 1,000 mg by mouth daily.    Coenzyme Q-10 100 MG Oral Cap Take 100 mg by mouth daily.    budeson-glycopyrrol-formoterol 160-9-4.8 MCG/ACT Inhalation Aerosol Inhale 2 puffs into the lungs as needed.       Review of Systems:   Review of Systems    Physical Exam:   Vital Signs:  Blood pressure (!) 184/71, pulse 90, temperature 98.1 °F (36.7 °C), temperature source Oral, resp. rate 12, height 5' 3\" (1.6 m), weight 164 lb (74.4 kg), SpO2 99%.  Physical Exam  Constitutional: The patient is alert and awake in no apparent distress.    Skin: There are no prior incisions, scars, or lesions over the left knee.   Musculoskeletal:   Gait: The patient ambulates with an antalgic gait with pain in the left knee.  Alignment of the left knee is varus. This is correctable.   Mild Left knee effusion noted  There is tenderness to palpation over: medial joint line, but not the lateral joint lines, patellofemoral joint line, pes anserine bursa, IT band.  Passive knee range of motion testing revealed 3? - 120? extension to flexion.   Positive patellar crepitus   Negative patellar grind test    There is no laxity with anteroposterior drawer test  Stable to Varus/Valgus stress.   Ipsilateral straight leg raise test is negative for radiculopathy.   Ipsilateral hip  passive ROM is painless.     Neuro: The patient has 5/5 strength in the left quadriceps, hamstrings, tibialis anterior, gastrocnemius-soleus complex, EHL, and FHL.   Able to straight leg raise against gravity  Sensation is intact to light touch in the left superficial peroneal, deep peroneal, saphenous, sural, and tibial nerve distributions.   Vascular exam: Toes are warm and well perfused. DP pulse is palpable.    Imaging Interpretation:  I personally reviewed and interpreted the patient's imaging consisting of 3 views. Review of the left knee x-rays reveal advanced joint space narrowing in the medial and patellofemoral compartments.    Mechanical axis view obtained today demonstrates varus left knee alignment.     Results:     Lab Results   Component Value Date    WBC 5.58 12/29/2021    HGB 12.7 12/29/2021    HCT 39.5 12/29/2021     12/29/2021    CREATSERUM 0.62 12/29/2021    BUN 26.0 (H) 12/04/2020     12/04/2020    K 4.27 12/04/2020     12/04/2020    CO2 22.7 12/04/2020     (H) 12/04/2020    CA 10.0 12/04/2020    ALB 4.5 12/29/2021    ALKPHO 52 (L) 12/29/2021    BILT 0.18 12/29/2021    TP 6.6 12/29/2021    AST 19 12/29/2021    ALT 20 12/29/2021    T4F 1.15 07/25/2017    TSH 2.330 11/02/2020    DDIMER 0.47 05/07/2017    ESRML 7 12/29/2021    CRP <0.30 12/29/2021    MG 2.4 05/08/2017    TROP <0.046 05/08/2017     No results found.        Assessment/Plan:   Assessment:   This is a 74yo lady, presenting for evaluation of left knee pain. The patient's history, physical exam, and imaging are consistent with advanced left knee osteoarthritis. This is significantly limiting the patient's function and is refractory to non-operative treatment. The patient is indicated for total knee arthroplasty and she wishes to proceed.      Plan:    We discussed the surgical procedure, implant selection, risks and benefits, and pj-operative course including discharge planning. We also discussed what is  involved with surgery including potential risks and complications. The patient understood that the procedure may not fully alleviate the pain or return range of motion. The patient understood that there are risks associated with the surgery including but not limited to, problems with anesthesia (death), as well as surgical complications of infection, instability (giving out of the knee), fractures of the bones, loosening or failure of implants, hematoma (blood accumulation in/near the knee) which may require surgical drainage, blood clots, pulmonary embolism, and blood vessel or nerve injury.      The patient again states that they are markedly debilitated by this pain. The patient feels that they have failed non operative measures and wishes to proceed with surgery. The patient verbalized understanding of the information presented today and endorsed a willingness to comply with instructions. All the patient's questions were answered to his/her satisfaction.      **Certification      PHYSICIAN Certification of Need for Inpatient Hospitalization - Initial Certification    Patient will require inpatient services that will reasonably be expected to span two midnight's based on the clinical documentation in H+P.   Based on patients current state of illness, I anticipate that, after discharge, patient will require TBD.        Omar Atef Behery, MD  7/8/2024

## 2024-07-08 NOTE — OPERATIVE REPORT
[facility]  OPERATIVE REPORT    DATE OF SURGERY: 7/8/2024    PREOPERATIVE DIAGNOSIS:   Left knee osteoarthritis    POST-OPERATIVE DIAGNOSIS:   Left knee osteoarthritis    PROCEDURE:  Left primary total knee arthroplasty with patellar resurfacing    Location: [facility]    SURGEON: Omar A Behery, MD  Assistant(s): Mat Villarreal CSA, MD    Anesthesia type:  Spinal  Estimated Blood Loss: 50cc    Drains: None    Complications: None immediately apparent    Findings: End stage osteoarthritis with full thickness cartilage loss    Specimen: Resected bone and cartilage    Implants:  Ceasar Triathlon CR size 3 femoral component  Ceasar Triathlon Universal Tibial Baseplate size 2  Poly: 11mm cruciate stabilized insert  32mm Cemented Patellar button    Indication:   This is a 73 year old lady, who presented with chronic knee pain refractory to non-operative treatment (including pain relieving medication, corticosteroid injections and physical therapy), and impairing activities of daily living. Radiographic evaluation demonstrated knee osteoarthritis with varus deformity. Surgical versus non-surgical options were discussed with the patient, and together we decided it is best to proceed with a total knee arthroplasty with the goals of pain relief and improvement in function. All risks and benefits of surgery including bleeding, infection, instability, eda-prosthetic fracture, extensor mechanism injury, neurovascular injury, as well as medical and anesthesia-related complications were discussed with the patient / family, who elected to proceed with surgery.     Procedure in detail:    Following correct identification of the patient and the correct extremity, the patient was taken to the operating room and positioned supine on a regular table. Eda-operative antibiotics and the above anesthesia were administered prior to incision per the standard protocol. A non-sterile padded tourniquet  was placed on the thigh during the procedure and the extremity was prepped and draped in the usual sterile fashion. The extremity was exsanguinated using Esmarch bandage and the tourniquet was raised to 250mmHg.    A midline incision was made medial to the tibial tubercle centered over patella taken down to the joint capsule of the knee. Minimal subfascial flaps were created. A medial parapatellar arthrotomy was performed. The deep MCL was subperiosteally elevated at the medial proximal tibial joint line for exposure. The fat pad was released off of the tibial plateau laterally. The patella was everted and the knee was flexed. The remnants of the anterior horn of the medial and lateral meniscus were removed as well as the ACL and PCL from the intercondylar notch. All distal protruding osteophytes were removed.     We then identified the appropriate starting point on the distal femur based on preoperative templating and accessed the intramedullary canal of the femur using a drill. The canal was lavaged, suctioned and the intramedullary  distal femur cutting guide was inserted. The distal femur was resected in the appropriate amount valgus according to the preoperative template. The resection depth and level was checked to be appropriate and our attention was then turned to the tibia.     The tibia was subluxed anteriorly and exposed with retractors. The lateral meniscus was excised. An external medullary cutting guide was attached using proximal tibial pins, and the proximal tibial surface was resected in an alignment perpendicular to the mechanical axis, and to a depth accommodating the smallest insert/tibial baseplate thickness.     The knee was then brought into full extension and our extension gap was assessed. Appropriate releases were performed in extension on the side of the concavity of the deformity to form a rectangular flexion space and spacer block was placed securely into a rectangular extension space  giving good collateral stability and recreation of our mechanical axis.     The knee was then flexed to 90 degrees. The femur was appropriately sized based on posterior referencing. A tensioner device was inserted into the flexion space and tensioned at 90 degrees, establishing appropriate femoral external rotation, and the posterior referencing holes were drilled. The iris-posterior 4-in-1 cutting block was then pinned in the appropriate amount of external rotation based on a symmetric flexion gap. We then performed the anterior/posterior and chamfer cuts for the femoral component. A spacer block with a drop rebekah was used to verify balance and mechanical alignment.   A laminar  was then placed laterally first then medially, allowing exposure and removal of the remnants of the posterior horns of the medial and lateral menisci as well as any posterior osteophyte. Pain cocktail injection was delivered into the posterior capsule.     The tibia was then subluxed anteriorly and sized. The tibial template guide was pinned, reamed and the keel was punched in the appropriate amount of external rotation. A trial tibial baseplate was inserted. The femur was then exposed again, and a trial femur was placed, the knee was reduced with a trial insert and was taken through a range of motion and found to be stable in the varus/valgus plane 0-30 degrees of flexion and the AP plane at 90 degrees of flexion.     Our attention was then turned to the patella. A symmetric resection was performed using an oscillating saw to recreate native patella height. All extraneous osteophyte and synovium was removed. A trial button was placed and the patella tracked centrally using the no thumbs technique. All trial components were removed. The final components were opened on the back table. The femur and the tibia were copiously lavaged and dried to allow maximum cement interdigitation. A periarticular pain cocktail injection was delivered  to the soft tissues. Two packs of high viscosity cement (Refobacin) were vacuum mixed and the components were cemented (tibia, femur then patella. The knee was reduced with a trial insert in place and the cement was allowed to cure. Any excess cement was removed to minimize third body wear. Once the cement cured, the tourniquet was released meticulous hemostasis was obtained. Any extraneous cement was removed from around the knee taking particular care to remove extraneous cement from the posterior aspect of the knee. The final insert was then placed onto the tibial tray, impacted and locked. Stability testing was consistent with intraoperative trialing and the patella tracked centrally.     The knee was then copiously lavaged with dilute betadine and saline irrigation.  The deep capsule was closed with #1 Vicryl interrupted suture and a #2 quill barbed suture. The subcutaneous tissue was closed with 2-0 Vicryl absorbable suture and the skin was closed with 3-0 monocryl and dermabond. A sterile occlusive dressing was applied.     The patient tolerated the procedure well and taken to the recovery unit without immediate complications.         Post-operative Plan:    The patient will receive post-operative antibiotics for 24 hours as surgical prophylaxis.   VTE prophylaxis will consist of early mobilization, mechanical compressive devices, and Aspirin 81 BID if not medically contra-indicated.  The patient will be weight-bearing as tolerated and allowed to mobilize with physical therapy.  The patient will be permitted range of motion as tolerated.    The patient will follow up in clinic in 2 weeks for a wound check, and radiographic evaluation.

## 2024-07-09 LAB
ATRIAL RATE: 83 BPM
P AXIS: 66 DEGREES
P-R INTERVAL: 174 MS
Q-T INTERVAL: 400 MS
QRS DURATION: 96 MS
QTC CALCULATION (BEZET): 470 MS
R AXIS: 75 DEGREES
T AXIS: 73 DEGREES
VENTRICULAR RATE: 83 BPM

## 2024-07-09 PROCEDURE — 99233 SBSQ HOSP IP/OBS HIGH 50: CPT | Performed by: HOSPITALIST

## 2024-07-09 RX ORDER — OXYBUTYNIN CHLORIDE 10 MG/1
10 TABLET, EXTENDED RELEASE ORAL DAILY
Qty: 90 TABLET | Refills: 3 | Status: SHIPPED | OUTPATIENT
Start: 2024-07-09

## 2024-07-09 NOTE — PROGRESS NOTES
[facility]     ORTHO DAILY PROGRESS NOTE    Patient: Jaclyn Block    Subjective:  Nothing acute overnight.  Pain in the operative extremity is well controlled.  Patient denies new onset numbness/tingling in the operative extremity.  Patient also denies chest pain / shortness of breath, nausea/vomiting.        Objective:  Vitals:    07/09/24 0653   BP: 145/62   Pulse: 74   Resp: 18   Temp:      I/O last 3 completed shifts:  In: 910 [I.V.:800; IV PIGGYBACK:110]  Out: 1250 [Urine:1250]     Gen: awake, alert, not in acute distress  Abdomen nondistended, non-tender    Left Lower Extremity: dressing clean, dry, intact.    Sensation intact to light touch in Sural/Saphenous/Tibial/Superficial Peroneal/Deep Peroneal and Tibial distributions.   Motor exam : 5/5 EHL/FHL/TA/GSC.  Vascular exam: Palpable dorsalis pedis pulses. Cap refill ~2s in the toes. Foot warm and well perfused    Labs:  Lab Results   Component Value Date    HGB 13.4 07/08/2024    HGB 12.7 12/29/2021    HGB 13.2 10/11/2021     No results found for: \"INR\", \"PROTIME\"      ASSESSMENT/PLAN: 73 year old yo female s/p left total knee arthroplasty on  7/8/2024   - Weight bearing status: WBAT LLE  - Range of motion: As tolerated  - Mechanical DVT prophylaxis and chemoprophylaxis with ASA 81 BID   - 24 hours of perioperative antibiotics  - PT for mobilization and motion  - Advance diet as tolerated. Discontinue IV fluids POD1 if tolerating diet.   - Multimodal pain control regimen ordered  - Disposition: Pending PT Evaluation. SANJUANITA (case management) vs SAR Omar Behery, MD  [facility]

## 2024-07-09 NOTE — PHYSICAL THERAPY NOTE
PHYSICAL THERAPY KNEE EVALUATION - INPATIENT      Room Number: Room 5/Room 5-A  Evaluation Date: 2024  Type of Evaluation: Initial  Physician Order: PT Eval and Treat    Presenting Problem: s/p left TKA 24     Reason for Therapy: Mobility Dysfunction and Discharge Planning    PHYSICAL THERAPY ASSESSMENT   Patient is a 73 year old female admitted 2024 for left TKA.  Prior to admission, patient's baseline is independent.  Patient is currently functioning near baseline with transfers, gait, and stair negotiation.  Patient is requiring supervision as a result of the following impairments: decreased functional strength and decreased endurance/aerobic capacity.  Physical Therapy will continue to follow for duration of hospitalization.    Patient will benefit from continued skilled PT Services at discharge to promote functional independence in home.  Anticipate patient will return home with home health PT.    PLAN  PT Treatment Plan: Endurance;Energy conservation;Patient education;Gait training;Range of motion;Strengthening  Rehab Potential : Good  Frequency (Obs): Daily    PHYSICAL THERAPY MEDICAL/SOCIAL HISTORY        Problem List  Principal Problem:    Primary osteoarthritis of left knee  Active Problems:    Hyperlipidemia    JAYSON (obstructive sleep apnea)    Autoimmune urticaria    Pulmonary emphysema (HCC)    Essential hypertension      HOME SITUATION  Home Situation  Type of Home: House  Home Layout: Able to live on main level;Stairlift  Stairs to Enter : 2  Lives With: Roommate  Patient Owned Equipment: Rolling walker  Patient Regularly Uses: None     Prior Level of [place]: independent    SUBJECTIVE  Pt reporting left knee pain \"not bad\"    PHYSICAL THERAPY EXAMINATION   OBJECTIVE     Fall Risk: Standard fall risk    WEIGHT BEARING RESTRICTION  Weight Bearing Restriction: L lower extremity           L Lower Extremity: Weight Bearing as Tolerated    PAIN ASSESSMENT  Ratin  Location: left  knee  Management Techniques: Activity promotion    COGNITION  Overall Cognitive Status:  WFL - within functional limits    RANGE OF MOTION AND STRENGTH ASSESSMENT  Upper extremity ROM and strength are within functional limits   Lower extremity ROM is within functional limits except left knee  Lower extremity strength is within functional limits     BALANCE  Static Sitting: Good  Dynamic Sitting: Good  Static Standing: Fair  Dynamic Standing: Fair                                                                            AM-PAC '6-Clicks' INPATIENT SHORT FORM - BASIC MOBILITY  How much difficulty does the patient currently have...  Patient Difficulty: Turning over in bed (including adjusting bedclothes, sheets and blankets)?: None   Patient Difficulty: Sitting down on and standing up from a chair with arms (e.g., wheelchair, bedside commode, etc.): None   Patient Difficulty: Moving from lying on back to sitting on the side of the bed?: None   How much help from another person does the patient currently need...   Help from Another: Moving to and from a bed to a chair (including a wheelchair)?: None   Help from Another: Need to walk in hospital room?: A Little   Help from Another: Climbing 3-5 steps with a railing?: A Little     AM-PAC Score:  Raw Score: 22   Approx Degree of Impairment: 20.91%   Standardized Score (AM-PAC Scale): 53.28   CMS Modifier (G-Code): CJ     FUNCTIONAL ABILITY STATUS  Functional Mobility/Gait Assessment  Gait Assistance: Modified independent  Distance (ft): 150  Assistive Device: Rolling walker  Pattern: Within Functional Limits  Stairs: Stairs  How Many Stairs: 4  Device: 2 Rails  Assist: Supervision  Supine to Sit: independent  Sit to Supine: NT  Sit to Stand: modified independent    Exercise/Education Provided:  Functional activity tolerated  Gait training  ROM  Strengthening    Patient ok to see per rn.    Pt recd in supine, educated in role of PT, goals for session, importance of  consistent mobility, ankle pumps for DVT prevention, use of ice for edema control. Pt agreeable to PT.   Pt educated in and performed LE therex per TKA protocol, issued written HEP.   Pt transferred supine to sit with no assist.  Pt provided verbal instruction and demonstration of rw use, stood to rw without assist, able to perform left TKE, marching in place with no left knee buckling, instructed in gait sequencing.  Pt amb with rw with gait training emphasis on heel toe pattern, upright posture, with allowance for left knee flexion during swing phase of gait.  Pt tolerated ambulation well, able to progress to step through gait pattern. Pt returned to recliner, ice reapplied to left knee. Pt educated about POC, discussed dc recommendation.   Pt educated in use of towel roll under left ankle to promote right knee terminal extension, educated in importance of achieving ROM in a timely fashion.  Discussed session with RN, discussed assist need with PCT.       THERAPEUTIC EXERCISES  Lower Extremity Ankle pumps  Heel slides  LAQ  Quad sets     Position Sitting and Supine         The patient's Approx Degree of Impairment: 20.91% has been calculated based on documentation in the [facility] '6 clicks' Inpatient Basic Mobility Short Form.  Research supports that patients with this level of impairment may benefit from home with home PT.  Final disposition will be made by interdisciplinary medical team.    Patient End of Session: Up in chair;Needs met;Call light within reach;RN aware of session/findings;All patient questions and concerns addressed;Ice applied    CURRENT GOALS    Goals to be met by: 7/10/24  Patient Goal Patient's self-stated goal is: to go home with home PT   Goal #1 Patient is able to demonstrate supine - sit EOB @ level: modified independent     Goal #1   Current Status    Goal #2 Patient is able to demonstrate transfers Sit to/from Stand at assistance level: supervision     Goal #2  Current Status    Goal #3  Patient is able to ambulate 150 feet with assistive device at assistance level: supervision   Goal #3   Current Status    Goal #4 Patient will negotiate 4 stairs/one curb w/ assistive device and supervision   Goal #4   Current Status    Goal #5  AROM 0 degrees extension to 95 degrees flexion     Goal #5   Current Status    Goal #6 Patient independently performs home exercise program for ROM/strengthening per the instructions provided in preparation for discharge.   Goal #6  Current Status        Patient Evaluation Complexity Level:  History Low - no personal factors and/or co-morbidities   Examination of body systems Low - addressing 1-2 elements   Clinical Presentation Low - Stable   Clinical Decision Making Low Complexity     Gait Training: 15 minutes  Therapeutic Activity: 15 minutes

## 2024-07-09 NOTE — PROGRESS NOTES
[facility]  part of [facility]    Progress Note    Jaclyn Block Patient Status:  Outpatient in a Bed    1950 MRN [id number]   Location [facility] Attending Behery, Omar Atef, MD   Hosp Day # 0 PCP Zoë Erwin MD       Subjective:   Jaclyn Block is a(n) 73 year old female was seen and examined  Lying in bed, NAD  No cp, sob, f,c,n,v abd pain or ha  Pain controlled  Earlier had some chest pain which has resolved now    Objective:   Blood pressure 117/50, pulse 74, temperature 98.2 °F (36.8 °C), temperature source Oral, resp. rate 18, height 5' 3\" (1.6 m), weight 164 lb (74.4 kg), SpO2 94%.    GENERAL:  Alert and oriented to time, place, and person.  No acute distress.  HEENT:  Atraumatic.  Oropharynx clear.  Moist mucous membranes.  Ears, Nose:  Normal.  Eyes:  Anicteric sclerae.   NECK:  Supple.  No lymphadenopathy.  Trachea midline.  Full range of motion.  LUNGS:  Clear to auscultation bilaterally.  Normal respiratory effort.  HEART:  Regular rate, rhythm.  S1 and S2 auscultated.  No murmur.  ABDOMEN:  Soft, nondistended.  No tenderness.  Positive bowel sounds.  EXTREMITIES:  Left knee dressing.  No leg edema, clubbing, or cyanosis.  NEUROLOGIC:  No acute focal defect.       Current Inpatient Medications:     Current Facility-Administered Medications:     sodium chloride 0.9 % IV bolus 500 mL, 500 mL, Intravenous, Once PRN    aspirin DR tab 81 mg, 81 mg, Oral, BID    sennosides (Senokot) tab 17.2 mg, 17.2 mg, Oral, Nightly    docusate sodium (Colace) cap 100 mg, 100 mg, Oral, BID    polyethylene glycol (PEG 3350) (Miralax) 17 g oral packet 17 g, 17 g, Oral, Daily PRN    magnesium hydroxide (Milk of Magnesia) 400 MG/5ML oral suspension 30 mL, 30 mL, Oral, Daily PRN    bisacodyl (Dulcolax) 10 MG rectal suppository 10 mg, 10 mg, Rectal, Daily PRN    fleet enema (Fleet) 7-19 GM/118ML rectal enema 133 mL, 1 enema, Rectal, Once PRN    ondansetron (Zofran) 4 MG/2ML injection 4 mg,  4 mg, Intravenous, Q6H PRN    metoclopramide (Reglan) 5 mg/mL injection 10 mg, 10 mg, Intravenous, Q8H PRN    diphenhydrAMINE (Benadryl) cap/tab 25 mg, 25 mg, Oral, Q4H PRN **OR** diphenhydrAMINE (Benadryl) 50 mg/mL  injection 12.5 mg, 12.5 mg, Intravenous, Q4H PRN    oxyCODONE immediate release tab 5 mg, 5 mg, Oral, Q4H PRN **OR** oxyCODONE immediate release tab 5 mg, 5 mg, Oral, Q4H PRN    sodium chloride 0.9% infusion, , Intravenous, Continuous    acetaminophen (Tylenol Extra Strength) tab 1,000 mg, 1,000 mg, Oral, q6h    ketorolac (Toradol) 15 MG/ML injection 15 mg, 15 mg, Intravenous, Q6H    fluticasone-umeclidin-vilant (Trelegy Ellipta) 100-62.5-25 MCG/ACT inhaler 1 puff, 1 puff, Inhalation, Daily    buPROPion SR (WELLBUTRIN SR) 12 hr tab 100 mg, 100 mg, Oral, Daily    ezetimibe (Zetia) tab 10 mg, 10 mg, Oral, Nightly    ferrous sulfate DR tab 325 mg, 325 mg, Oral, Daily with breakfast    cetirizine (ZyrTEC) tab 10 mg, 10 mg, Oral, Nightly    lisinopril (Zestril) tab 10 mg, 10 mg, Oral, Daily    melatonin cap/tab 5 mg, 5 mg, Oral, Nightly PRN    oxybutynin ER (Ditropan-XL) 24 hr tab 10 mg, 10 mg, Oral, Daily    mycophenolate mofetil (Cellcept) cap 500 mg, 500 mg, Oral, BID    Assessment and Plan:   1.       Left knee primary osteoarthritis, status post left total knee arthroplasty.  Spinal block.  Pain control.  Neurovascular checks.  Aspirin for DVT prophylaxis.  Physical and occupational therapy.    2.       Obstructive sleep apnea.    Apply obstructive sleep apnea protocol.  Monitor.    3.       Essential hypertension.    Continue home medication and monitor.    4.       Hyperlipidemia.    Continue home medications.    5.       Autoimmune urticaria.    Continue home medications.    6.       Chronic obstructive pulmonary disease.    Continue home medications.  Monitor respiratory status.     7.       Chest pain  Resolved, EKG reviewed, no st-t changes seen  Cont to monitor    Full Code    Results:     Recent  Labs   Lab 07/08/24  1722   RBC 4.48   HGB 13.4   HCT 39.4   MCV 87.9   MCH 29.9   MCHC 34.0   RDW 13.2   WBC 6.0   .0*         Recent Labs   Lab 07/08/24  1722   *   BUN 17   CREATSERUM 0.75   EGFRCR 84   CA 9.1      K 4.0      CO2 26.0         Imaging:   XR KNEE (1 OR 2 VIEWS), LEFT (CPT=73560)    Result Date: 7/8/2024  CONCLUSION:  1. Total left knee arthroplasty.    Dictated by (CST): Sav Hernandez MD on 7/08/2024 at 4:31 PM     Finalized by (CST): Sav Hernandez MD on 7/08/2024 at 4:31 PM         EKG 12 Lead    Result Date: 7/9/2024  Normal sinus rhythm Incomplete right bundle branch block Borderline ECG When compared with ECG of 07-MAY-2017 12:55, No significant change was found       Phani Garduno MD  7/9/2024

## 2024-07-09 NOTE — OCCUPATIONAL THERAPY NOTE
OCCUPATIONAL THERAPY EVALUATION - INPATIENT     Room Number: Room 5/Room 5-A  Evaluation Date: 7/9/2024  Type of Evaluation: Initial       Physician Order: IP Consult to Occupational Therapy  Reason for Therapy: ADL/IADL Dysfunction and Discharge Planning    OCCUPATIONAL THERAPY ASSESSMENT     Patient is a 73 year old female admitted 7/8/2024 for L TKA: WBAT.  Prior to admission, pt was independent.  Patient is currently requiring up to SBA for ADLs overall along with independence for functional transfers at RW level. Pt able to dress, toilet, and groom herself. SBA recommended for bathing. Pt requested home health OT. She reports she will hire a caregiver as her two male roommates and brother cannot assist. Pt owns reacher and RTS. OT to sign off.     Education provided  Educated pt about role of OT and hospital therapy process as well as proper safety and adaptive dressing techniques. Pt verbalized/demonstrated proper carryover.    Patient will benefit from continued skilled OT Services at home.      OCCUPATIONAL THERAPY MEDICAL/SOCIAL HISTORY     Problem List  Principal Problem:    Primary osteoarthritis of left knee  Active Problems:    Hyperlipidemia    JAYSON (obstructive sleep apnea)    Autoimmune urticaria    Pulmonary emphysema (HCC)    Essential hypertension      Past Medical History  Past Medical History:    Abdominal hernia    Abdominal pain    Anemia    Anxiety    Arthritis    Atypical mole    Autoimmune disorder (HCC)    Back pain    Bad breath    Blood in urine    Cataract    Chest pain on exertion    Chronic cough    Depression    Diarrhea, unspecified    Diverticulosis of large intestine    Dizziness    Easy bruising    Esophageal reflux    Eye disease    Fatigue    Am caregiver for sister    Feeling lonely    Flatulence/gas pain/belching    Food intolerance    Frequent urination    Hearing loss    Heartburn    Reflux wakes me at night    High blood pressure    High cholesterol    History of blood  transfusion    History of depression    History of mental disorder    Diagnosed schizo affective    HYPERLIPIDEMIA    HYPERTRIGLYCERIDEMIA    Incontinence    Irregular bowel habits    Leaking of urine    Since maybe jan 2019    Low back pain    intermittent    Nausea    Night sweats    JAYSON (obstructive sleep apnea)    AHI 50/ RDI 50/ REM AHI 34/ SUPINE / SaO2 albert 86%/ CPAP 10/ HTR    OSTEOPOROSIS    Pain in joints    Presence of other cardiac implants and grafts    Rash    Take mycophenolate for hives    Schizoaffective disorder (HCC)    Shortness of breath    Sleep apnea    Sleep disturbance    Stress    Urge incontinence    Urticaria    Visual impairment    Wears glasses    Weight gain       Past Surgical History  Past Surgical History:   Procedure Laterality Date    Appendectomy      Cataract      Knee replacement surgery      right    Tonsillectomy      Total knee replacement Right        HOME SITUATION  Type of Home: House  Home Layout: Able to live on main level;[employer]  Lives With: Roommate                         Patient Regularly Uses: None    SUBJECTIVE  \"I need someone to help toilet me.\"    OCCUPATIONAL THERAPY EXAMINATION      OBJECTIVE     Fall Risk: Standard fall risk    PAIN ASSESSMENT  Rating: Unable to rate  Location: L knee          RANGE OF MOTION   Upper extremity ROM is within functional limits     STRENGTH ASSESSMENT  Upper extremity strength is within functional limits     COORDINATION  Gross Motor: WFL   Fine Motor: WFL     ACTIVITIES OF DAILY LIVING ASSESSMENT  AM-[facility] ‘6-Clicks’ Inpatient Daily Activity Short Form  How much help from another person does the patient currently need…  -   Putting on and taking off regular lower body clothing?: None  -   Bathing (including washing, rinsing, drying)?: A Little  -   Toileting, which includes using toilet, bedpan or urinal? : None  -   Putting on and taking off regular upper body clothing?: None  -   Taking care of personal grooming  such as brushing teeth?: None  -   Eating meals?: None    AM-PAC Score:  Score: 23  Approx Degree of Impairment: 15.86%  Standardized Score (AM-PAC Scale): 51.12  CMS Modifier (G-Code): CI            FUNCTIONAL TRANSFER ASSESSMENT        Sit to stand: sup-ind  Toilet Transfer: sup-ind  Chair Transfer: sup-ind    FUNCTIONAL ADL ASSESSMENT  Overall SBA    The patient's Approx Degree of Impairment: 15.86% has been calculated based on documentation in the [facility] '6 clicks' Inpatient Daily Activity Short Form.  Research supports that patients with this level of impairment may benefit from home health. Final disposition will be made by interdisciplinary medical team.       Patient Evaluation Complexity Level:   Occupational Profile/Medical History LOW - Brief history including review of medical or therapy records    Specific performance deficits impacting engagement in ADL/IADL LOW  1 - 3 performance deficits    Client Assessment/Performance Deficits LOW - No comorbidities nor modifications of tasks    Clinical Decision Making LOW - Analysis of occupational profile, problem-focused assessments, limited treatment options    Overall Complexity LOW     OT Session Time: 20 minutes  Self-Care Home Management: 10 minutes   patty Keita OT  [facility]  Inpatient Rehabilitation  Occupational Therapy  [phone]

## 2024-07-10 VITALS
OXYGEN SATURATION: 95 % | TEMPERATURE: 98 F | BODY MASS INDEX: 29.06 KG/M2 | HEART RATE: 86 BPM | RESPIRATION RATE: 16 BRPM | SYSTOLIC BLOOD PRESSURE: 138 MMHG | DIASTOLIC BLOOD PRESSURE: 46 MMHG | HEIGHT: 63 IN | WEIGHT: 164 LBS

## 2024-07-10 PROCEDURE — 99239 HOSP IP/OBS DSCHRG MGMT >30: CPT | Performed by: INTERNAL MEDICINE

## 2024-07-10 RX ORDER — ASPIRIN 81 MG/1
81 TABLET ORAL 2 TIMES DAILY
Qty: 60 TABLET | Refills: 0 | Status: SHIPPED | OUTPATIENT
Start: 2024-07-10

## 2024-07-10 NOTE — PHYSICAL THERAPY NOTE
PHYSICAL THERAPY KNEE TREATMENT NOTE - INPATIENT     Room Number: Room 5/Room 5-A             Presenting Problem: s/p left TKA 24       Problem List  Principal Problem:    Primary osteoarthritis of left knee  Active Problems:    Hyperlipidemia    JAYSON (obstructive sleep apnea)    Autoimmune urticaria    Pulmonary emphysema (HCC)    Essential hypertension      PHYSICAL THERAPY ASSESSMENT   Patient demonstrates good  progress this session, goals  remain in progress.    Patient continues to function below baseline with bed mobility, transfers, and gait.  Contributing factors to remaining limitations include decreased functional strength, decreased endurance/aerobic capacity, and pain.  Next session anticipate patient to progress bed mobility, transfers, and gait.  Physical Therapy will continue to follow patient for duration of hospitalization.    Patient continues to benefit from continued skilled PT services: at discharge to promote functional independence in home.  Anticipate patient will return home with home health PT.    PLAN  PT Treatment Plan: Bed mobility;Body mechanics;Endurance;Gait training  Frequency (Obs): Daily    SUBJECTIVE  Pt reports being ready for PT RX  OBJECTIVE       WEIGHT BEARING STATUS           L Lower Extremity: Weight Bearing as Tolerated    PAIN ASSESSMENT   Ratin  Location: left knee  Management Techniques: Activity promotion    BALANCE  Static Sitting: Good  Dynamic Sitting: Good  Static Standing: Fair  Dynamic Standing: Fair    ACTIVITY TOLERANCE                         O2 WALK       AM-PAC '6-Clicks' INPATIENT SHORT FORM - BASIC MOBILITY  How much difficulty does the patient currently have...  Patient Difficulty: Turning over in bed (including adjusting bedclothes, sheets and blankets)?: A Little   Patient Difficulty: Sitting down on and standing up from a chair with arms (e.g., wheelchair, bedside commode, etc.): A Little   Patient Difficulty: Moving from lying on back to  sitting on the side of the bed?: A Little   How much help from another person does the patient currently need...   Help from Another: Moving to and from a bed to a chair (including a wheelchair)?: A Little   Help from Another: Need to walk in hospital room?: A Little   Help from Another: Climbing 3-5 steps with a railing?: A Little     AM-PAC Score:  Raw Score: 18   Approx Degree of Impairment: 46.58%   Standardized Score (AM-PAC Scale): 43.63   CMS Modifier (G-Code): CK    FUNCTIONAL ABILITY STATUS  Functional Mobility/Gait Assessment  Gait Assistance: Contact guard assist  Distance (ft): 2 x 100  Assistive Device: Rolling walker  Pattern: Shuffle  Stairs: Stairs  How Many Stairs: 4  Device: 2 Rails  Assist: Contact guard assist  Pattern: Ascend and Descend  Rolling: minimal assist  Supine to Sit: minimal assist  Sit to Supine: minimal assist  Sit to Stand: minimal assist    Additional Information: Pm session.Min a for bed mobility and transfer;Extra time provided to complete task.EOB sitting balance activity with emphasis on core stabilization.Pt amb 2 x 100 ft with RW and CGA;provided cuing for gait pattern as well as for postural awareness.There ex.Navigated 4 stairs with CGA    The patient's Approx Degree of Impairment: 46.58% has been calculated based on documentation in the [facility] '6 clicks' Inpatient Basic Mobility Short Form.  Research supports that patients with this level of impairment may benefit from Home with Home Health PT.  Final disposition will be made by interdisciplinary medical team.    Exercises AM Session PM Session   Ankle Pumps 20 reps 20 reps   Quad Sets 20 reps 20 reps   Glut Sets 20 reps 20 reps                                               Comments: Pt participated in group session, tolerance was .    Knee ROM                 Patient End of Session: Up in chair    CURRENT GOALS    Patient Goal Patient's self-stated goal is: to go home with home PT   Goal #1 Patient is able to demonstrate  supine - sit EOB @ level: modified independent     Goal #1   Current Status Min a   Goal #2 Patient is able to demonstrate transfers Sit to/from Stand at assistance level: supervision     Goal #2  Current Status Min a   Goal #3 Patient is able to ambulate 150 feet with assistive device at assistance level: supervision   Goal #3   Current Status Pt amb 2x 100 ft with RW and CGA   Goal #4 Patient will negotiate 4 stairs/one curb w/ assistive device and supervision   Goal #4   Current Status Navigated 4 stairs with CGA   Goal #5  AROM 0 degrees extension to 95 degrees flexion     Goal #5   Current Status In progress   Goal #6 Patient independently performs home exercise program for ROM/strengthening per the instructions provided in preparation for discharge.   Goal #6  Current Status In progress     Gait Training: 15 minutes  Therapeutic Activity: 15 minutes  Neuromuscular Re-education:  minutes  Therapeutic Exercise:  minutes  Canalith Repositioning:  minutes  Manual Therapy:  minutes  Can add/delete any of these

## 2024-07-10 NOTE — PHYSICAL THERAPY NOTE
PHYSICAL THERAPY KNEE TREATMENT NOTE - INPATIENT     Room Number: Room 5/Room 5-A             Presenting Problem: s/p left TKA 24       Problem List  Principal Problem:    Primary osteoarthritis of left knee  Active Problems:    Hyperlipidemia    JAYSON (obstructive sleep apnea)    Autoimmune urticaria    Pulmonary emphysema (HCC)    Essential hypertension      PHYSICAL THERAPY ASSESSMENT   Patient demonstrates good  progress this session, goals  remain in progress.    Patient continues to function below baseline with bed mobility, transfers, and gait.  Contributing factors to remaining limitations include decreased functional strength, decreased endurance/aerobic capacity, and pain.  Next session anticipate patient to progress bed mobility, transfers, and gait.  Physical Therapy will continue to follow patient for duration of hospitalization.    Patient continues to benefit from continued skilled PT services: at discharge to promote functional independence in home.  Anticipate patient will return home with home health PT.    PLAN  PT Treatment Plan: Bed mobility;Body mechanics;Endurance;Patient education  Frequency (Obs): Daily    SUBJECTIVE  Pt reports being ready for PT RX  OBJECTIVE       WEIGHT BEARING STATUS           L Lower Extremity: Weight Bearing as Tolerated    PAIN ASSESSMENT   Ratin  Location: left knee  Management Techniques: Activity promotion    BALANCE  Static Sitting: Good  Dynamic Sitting: Good  Static Standing: Fair  Dynamic Standing: Fair    ACTIVITY TOLERANCE                         O2 WALK       AM-PAC '6-Clicks' INPATIENT SHORT FORM - BASIC MOBILITY  How much difficulty does the patient currently have...  Patient Difficulty: Turning over in bed (including adjusting bedclothes, sheets and blankets)?: A Little   Patient Difficulty: Sitting down on and standing up from a chair with arms (e.g., wheelchair, bedside commode, etc.): A Little   Patient Difficulty: Moving from lying on back to  sitting on the side of the bed?: A Little   How much help from another person does the patient currently need...   Help from Another: Moving to and from a bed to a chair (including a wheelchair)?: A Little   Help from Another: Need to walk in hospital room?: A Little   Help from Another: Climbing 3-5 steps with a railing?: A Little     AM-PAC Score:  Raw Score: 18   Approx Degree of Impairment: 46.58%   Standardized Score (AM-PAC Scale): 43.63   CMS Modifier (G-Code): CK    FUNCTIONAL ABILITY STATUS  Functional Mobility/Gait Assessment  Gait Assistance: Contact guard assist  Distance (ft): 2 X 50  Assistive Device: Rolling walker  Pattern: Shuffle  Stairs: Stairs  How Many Stairs: 4  Device: 2 Rails  Assist: Supervision  Rolling: minimal assist  Supine to Sit: minimal assist  Sit to Supine: minimal assist  Sit to Stand: minimal assist    Additional Information: Am session.Min a for bed mobility and transfer;Extra time provided to complete task.EOB sitting balance activity with emphasis on core stabilization.Pt amb 2 x 50 ft with RW and CGA;provided cuing for gait pattern as well as for postural awareness.There ex.    The patient's Approx Degree of Impairment: 46.58% has been calculated based on documentation in the [facility] '6 clicks' Inpatient Basic Mobility Short Form.  Research supports that patients with this level of impairment may benefit from Home with Home Health PT.  Final disposition will be made by interdisciplinary medical team.    Exercises AM Session PM Session   Ankle Pumps 20 reps  reps   Quad Sets 20 reps  reps   Glut Sets 20 reps  reps                                               Comments: Pt participated in group session, tolerance was .    Knee ROM                 Patient End of Session: Up in chair;Call light within reach;RN aware of session/findings;All patient questions and concerns addressed    CURRENT GOALS    Patient Goal Patient's self-stated goal is: to go home with home PT   Goal #1  Patient is able to demonstrate supine - sit EOB @ level: modified independent     Goal #1   Current Status Min a   Goal #2 Patient is able to demonstrate transfers Sit to/from Stand at assistance level: supervision     Goal #2  Current Status Min a   Goal #3 Patient is able to ambulate 150 feet with assistive device at assistance level: supervision   Goal #3   Current Status Pt amb 2x 50 ft with RW and CGA   Goal #4 Patient will negotiate 4 stairs/one curb w/ assistive device and supervision   Goal #4   Current Status NT   Goal #5  AROM 0 degrees extension to 95 degrees flexion     Goal #5   Current Status In progress   Goal #6 Patient independently performs home exercise program for ROM/strengthening per the instructions provided in preparation for discharge.   Goal #6  Current Status In progress     Gait Training: 15 minutes  Therapeutic Activity: 15 minutes  Neuromuscular Re-education:  minutes  Therapeutic Exercise:  minutes  Canalith Repositioning:  minutes  Manual Therapy:  minutes  Can add/delete any of these

## 2024-07-10 NOTE — DISCHARGE SUMMARY
[facility]  part of [facility]    DISCHARGE SUMMARY     Jaclyn Block Patient Status:  Inpatient    1950 MRN [id number]   Location [facility] Attending Jordan Parish MD   Hosp Day # 2 PCP Zoë Erwin MD     Date of Admission: 2024  Date of Discharge:  7/10/2024    Discharge Disposition: Left Without Being Seen    Discharge Diagnosis:     L knee OA s/p L TKA  JAYSON  HTN  HLD  Autoimmune urticaria  COPD  Chest pain    History of Present Illness:   This is a pleasant 74yo lady who is presenting for evaluation of left knee pain secondary to advanced OA. Referred by Dr. Durán.     Patient has a history of chronic persistent left knee pain. This is not related to injury. The pain started several years ago. Pain is located medially and anteriorly, without radiation. This knee pain is associated with weight bearing activity.  Patient denies associated numbness/tingling in the left lower extremity.   The patient as tried non-operative treatment with NSAIDs, PT, injections. without durable pain relief.      Patient can currently only walk 1-2 blocks, and activities of daily living are limited by significant left knee pain.     PMH: idiopathic urticaria, on mycopholate per rheumatology, CAD (follows with cardiology).   PSH: right TKA done years ago - no issues presently.     Patient is retired. Lives alone in [place]. No family around to help. Former smoker, quit 6 years ago approximately.  Goals of treatment: pain relief.      Brief Synopsis:     Patient underwent L TKA and tolerated surgery well. She has had significant knee pain post op but has done well with PT and OT. She will be discharged home with  for therapy. The patient is to follow up with PCP and Ortho as opt for further care.  Discharge medications including pain meds and DVT ppx ordered by ortho.     Patient is to remain compliant with all discharge medications and instructions and to follow up as advised.    Patient encouraged to make healthy lifestyle and dietary changes.    Lace+ Score: 49  59-90 High Risk  29-58 Medium Risk  0-28   Low Risk       TCM Follow-Up Recommendation:  LACE 29-58: Moderate Risk of readmission after discharge from the hospital.      Procedures during hospitalization:   L TKA    Incidental or significant findings and recommendations (brief descriptions):  Natasha    Lab/Test results pending at Discharge:   None    Consultants:  Ortho    Discharge Medication List:     Discharge Medications        START taking these medications        Instructions Prescription details   aspirin 81 MG Tbec  Replaces: aspirin 81 MG Chew      Take 1 tablet (81 mg total) by mouth in the morning and 1 tablet (81 mg total) before bedtime.   Quantity: 60 tablet  Refills: 0     oxybutynin ER 10 MG Tb24  Commonly known as: Ditropan-XL      Take 1 tablet (10 mg total) by mouth daily.   Quantity: 90 tablet  Refills: 3            CHANGE how you take these medications        Instructions Prescription details   Mycophenolate Mofetil 500 MG Tabs  Commonly known as: CELLCEPT  What changed: when to take this      TAKE ONE TABLET BY MOUTH ONE TIME DAILY   Quantity: 90 tablet  Refills: 1            CONTINUE taking these medications        Instructions Prescription details   alendronate 35 MG Tabs  Commonly known as: Fosamax      Take 1 tablet (35 mg total) by mouth every 7 days.   Quantity: 12 tablet  Refills: 3     budeson-glycopyrrol-formoterol 160-9-4.8 MCG/ACT Aero  Commonly known as: BREZTRI AEROSPHERE      Inhale 2 puffs into the lungs as needed.   Refills: 0     buPROPion  MG Tb12  Commonly known as: WELLBUTRIN SR      Take 1 tablet (100 mg total) by mouth 2 (two) times daily.   Quantity: 180 tablet  Refills: 2     Calcium Carbonate-Vitamin D 500-125 MG-UNIT Tabs      Take 1 tablet by mouth daily.   Refills: 0     Coenzyme Q-10 100 MG Caps      Take 100 mg by mouth daily.   Refills: 0     ezetimibe 10 MG Tabs  Commonly  known as: Zetia       Refills: 0     famotidine 40 MG Tabs  Commonly known as: Pepcid      Take 1 tablet (40 mg total) by mouth daily.   Quantity: 90 tablet  Refills: 3     ferrous sulfate 325 (65 FE) MG Tbec      Take 1 tablet (325 mg total) by mouth daily with breakfast.   Refills: 0     GLUCOSAMINE CHONDR 500 COMPLEX OR      Take 1 tablet by mouth daily.   Refills: 0     ICaps AREDS Formula Tabs      Take by mouth.   Refills: 0     levocetirizine 5 MG Tabs  Commonly known as: Xyzal      Take 1 tablet (5 mg total) by mouth nightly.   Refills: 0     lisinopril 10 MG Tabs  Commonly known as: Zestril      Take 1 tablet (10 mg total) by mouth daily.   Refills: 0     Melatonin 5 MG Tabs      nightly as needed.   Refills: 0     omega-3 fatty acids 1000 MG Caps  Commonly known as: Fish Oil      Take 1,000 mg by mouth daily.   Refills: 0     XOCHITL-E OR      Take 1 capsule by mouth daily.   Refills: 0            STOP taking these medications      aspirin 81 MG Chew  Replaced by: aspirin 81 MG Tbec                  Where to Get Your Medications        These medications were sent to [facility]/pharmacy #[id number] - [place], [place] - [address] [phone], [phone] [address], [facility] [address]      Phone: [phone]   aspirin 81 MG Tbec  oxybutynin ER 10 MG Tb24         [place]    Follow-up appointment:   Zoë Erwin MD  [address]   [address]  [facility] [address] [phone]    Follow up in 1 week(s)      Behery, Omar Atef, MD  [address]  [address]  [facility] [address] [phone]    Follow up in 1 week(s)      Appointments for Next 30 Days 7/10/2024 - 8/9/2024        Date Arrival Time Visit Type Length Department Provider     7/10/2024  8:30 AM  [facility] PT IP FOLLOW UP [2411] 30 min [facility] Physical Therapy Gunner Merritt PTA    Patient Instructions:         Location Instructions:     Masks are optional for all patients and visitors, unless otherwise indicated.                       Vital signs:  Temp:  [97.3 °F (36.3 °C)-98.1 °F (36.7 °C)] 98.1 °F (36.7 °C)  Pulse:  [73-94] 86  Resp:  [16-18] 16  BP: (120-138)/(46-88) 138/46  SpO2:  [95 %-98 %] 95 %    Physical Exam:    Gen: NAD AO x3  Chest: good air entry CTABL  CVS: normal s1 and s2 RR  Abd: NABS soft NT ND  Neuro: CN 2-12 grossly intact  Ext: no edema in bilateral LE    -----------------------------------------------------------------------------------------------  PATIENT DISCHARGE INSTRUCTIONS: See electronic chart    Jordan Parish MD  Hospitalist    Time spent:  > 30 minutes    The 21st Century Cures Act makes medical notes like these available to patients in the interest of transparency. Please be advised this is a medical document. Medical documents are intended to carry relevant information, facts as evident, and the clinical opinion of the practitioner. The medical note is intended as peer to peer communication and may appear blunt or direct. It is written in medical language and may contain abbreviations or verbiage that are unfamiliar.

## 2024-07-10 NOTE — CM/SW NOTE
07/09/24 1400   CM/SW Referral Data   Referral Source Physician;Social Work (self-referral)   Reason for Referral Discharge planning   Informant Patient   Medical Hx   Does patient have an established PCP? Yes   Patient Info   Patient's Current Mental Status at Time of Assessment Alert;Oriented   Patient's Home Environment House   Number of Levels in Home 2   Patient lives with Rommate   Patient Status Prior to Admission   Independent with ADLs and Mobility Yes   Discharge Needs   Anticipated D/C needs Home health care   Choice of Post-Acute Provider   Informed patient of right to choose their preferred provider Yes   List of appropriate post-acute services provided to patient/family with quality data Yes   Patient/family choice [facility]     SW spoke with pt regarding Dc plans. Pt plans to go home with [facility]. Pt has CG lined up.     Pt would like to use Dr. Behery's preferred [facility] agency  - [facility] accepted    [facility]  [address] [place] [address] [address], [place] [address]  Phone: [phone]  Fax: [phone]    PLAN: home with [facility]    JERRY Chavarria, MSW ext. [phone]    
   07/10/24 0900   Discharge disposition   Expected discharge disposition Home-Health   Post Acute Care Provider   ([facility])   Discharge transportation Private car     SW informed  provider of pt discharge and requested follow up with pt/family for SOC in the community.   Diane GIL, LSW  Social Work/Case Management   
SW followed up on DC planning.     Per chart review, DC plan for [facility]. Pt has no [employer] benefits at this time as being home alone does not qualify for SNF stay.     Plan would need to be for home, with paid caregivers, or pay for a respite stay at a skilled facility if pt does not wish to be alone     Therapy notes pending at this time    PLAN: Home with Caregivers, or Respite Stay at a skilled facility     Marialuisa Saenz, JERRY, MSW ext. [phone]    
NEGATIVE

## 2024-08-28 ENCOUNTER — OFFICE VISIT (OUTPATIENT)
Dept: SURGERY | Facility: CLINIC | Age: 74
End: 2024-08-28
Payer: MEDICARE

## 2024-08-28 DIAGNOSIS — R82.90 URINE FINDING: ICD-10-CM

## 2024-08-28 DIAGNOSIS — N39.41 URGE INCONTINENCE: Primary | ICD-10-CM

## 2024-08-28 LAB
APPEARANCE: CLEAR
BILIRUBIN: NEGATIVE
GLUCOSE (URINE DIPSTICK): NEGATIVE MG/DL
KETONES (URINE DIPSTICK): NEGATIVE MG/DL
LEUKOCYTES: NEGATIVE
MULTISTIX LOT#: NORMAL NUMERIC
NITRITE, URINE: NEGATIVE
OCCULT BLOOD: NEGATIVE
PH, URINE: 5.5 (ref 4.5–8)
PROTEIN (URINE DIPSTICK): NEGATIVE MG/DL
SPECIFIC GRAVITY: 1.03 (ref 1–1.03)
URINE-COLOR: YELLOW
UROBILINOGEN,SEMI-QN: 0.2 MG/DL (ref 0–1.9)

## 2024-08-28 PROCEDURE — 81003 URINALYSIS AUTO W/O SCOPE: CPT | Performed by: PHYSICIAN ASSISTANT

## 2024-08-28 PROCEDURE — 99212 OFFICE O/P EST SF 10 MIN: CPT | Performed by: PHYSICIAN ASSISTANT

## 2024-08-28 RX ORDER — AMOXICILLIN 500 MG/1
500 CAPSULE ORAL 3 TIMES DAILY
COMMUNITY

## 2024-08-28 RX ORDER — GABAPENTIN 100 MG/1
CAPSULE ORAL
COMMUNITY

## 2024-08-28 RX ORDER — AMOXICILLIN 250 MG
CAPSULE ORAL
COMMUNITY
Start: 2024-07-10

## 2024-08-28 RX ORDER — MELOXICAM 15 MG/1
15 TABLET ORAL EVERY MORNING
COMMUNITY
Start: 2024-08-06

## 2024-08-28 RX ORDER — ONDANSETRON 4 MG/1
TABLET, FILM COATED ORAL
COMMUNITY

## 2024-08-28 RX ORDER — OXYCODONE HYDROCHLORIDE 5 MG/1
TABLET ORAL
COMMUNITY

## 2024-08-28 RX ORDER — CHOLECALCIFEROL (VITD3)/VIT K2 1250-200
1 CAPSULE ORAL
COMMUNITY

## 2024-08-28 RX ORDER — PANTOPRAZOLE SODIUM 40 MG/1
1 TABLET, DELAYED RELEASE ORAL EVERY MORNING
COMMUNITY
Start: 2024-08-19

## 2024-08-28 NOTE — PROGRESS NOTES
Subjective:   Jaclyn Block is a 73 year old female with hx of HL, CAD, emphysema, JAYSON who presents for follow up.    Patient seen last in March 2023. Doing well on oxybutynin 10mg ER and continued.     Still feels she is doing well. Tolerating medication well. Some dry mouth in the morning, but no constipation. No dysuria, gross hematuria. No infections. No hx of stones.      Prior urine cytology negative.   UA negative.    Former smoker.     History/Other:    Chief Complaint Reviewed and Verified  Nursing Notes Reviewed and   Verified  Allergies Reviewed         Current Outpatient Medications   Medication Sig Dispense Refill    amoxicillin 500 MG Oral Cap Take 1 capsule (500 mg total) by mouth 3 (three) times daily.      aspirin 81 MG Oral Tab EC Take 1 tablet (81 mg total) by mouth in the morning and 1 tablet (81 mg total) before bedtime. 60 tablet 0    oxybutynin ER 10 MG Oral Tablet 24 Hr Take 1 tablet (10 mg total) by mouth daily. 90 tablet 3    ezetimibe 10 MG Oral Tab       Melatonin 5 MG Oral Tab nightly as needed.      MYCOPHENOLATE MOFETIL 500 MG Oral Tab TAKE ONE TABLET BY MOUTH ONE TIME DAILY (Patient taking differently: Take 1 tablet (500 mg total) by mouth 2 (two) times daily.) 90 tablet 1    lisinopril 10 MG Oral Tab Take 1 tablet (10 mg total) by mouth daily.      alendronate 35 MG Oral Tab Take 1 tablet (35 mg total) by mouth every 7 days. 12 tablet 3    buPROPion HCl ER, SR, 100 MG Oral Tablet 12 Hr Take 1 tablet (100 mg total) by mouth 2 (two) times daily. (Patient taking differently: Take 1 tablet (100 mg total) by mouth daily.) 180 tablet 2    Multiple Vitamins-Minerals (ICAPS AREDS FORMULA) Oral Tab Take by mouth.      Glucosamine-Chondroit-Vit C-Mn (GLUCOSAMINE CHONDR 500 COMPLEX OR) Take 1 tablet by mouth daily.        ferrous sulfate 325 (65 FE) MG Oral Tab EC Take 1 tablet (325 mg total) by mouth daily with breakfast.      Coenzyme Q-10 100 MG Oral Cap Take 100 mg by mouth daily.       gabapentin 100 MG Oral Cap TAKE 2 CAPSULES 3 TIMES A DAY BY ORAL ROUTE FOR 21 DAYS.      oxyCODONE 5 MG Oral Tab TAKE 1 TABLET BY MOUTH EVERY 24 HOURS AS NEEDED FOR SEVERE PAIN      Vitamin D-Vitamin K (DECARA K) 1250-200 MCG Oral Cap Take 1 capsule by mouth daily with breakfast.      levocetirizine 5 MG Oral Tab Take 1 tablet (5 mg total) by mouth nightly.      budeson-glycopyrrol-formoterol 160-9-4.8 MCG/ACT Inhalation Aerosol Inhale 2 puffs into the lungs as needed.      S-Adenosylmethionine (XOCHITL-E OR) Take 1 capsule by mouth daily.        Calcium Carbonate-Vitamin D 500-125 MG-UNIT Oral Tab Take 1 tablet by mouth daily.   (Patient not taking: Reported on 8/28/2024)      omega-3 fatty acids 1000 MG Oral Cap Take 1,000 mg by mouth daily.         Review of Systems:  Pertinent items are noted in HPI.      Objective:   There were no vitals taken for this visit. Estimated body mass index is 29.05 kg/m² as calculated from the following:    Height as of 6/26/24: 5' 3\" (1.6 m).    Weight as of 7/8/24: 164 lb (74.4 kg).    No distress  Non labored respirations    Laboratory Data:  Lab Results   Component Value Date    WBC 6.0 07/08/2024    HGB 13.4 07/08/2024    .0 (L) 07/08/2024     Lab Results   Component Value Date     07/08/2024    K 4.0 07/08/2024     07/08/2024    CO2 26.0 07/08/2024    BUN 17 07/08/2024     (H) 07/08/2024    GFRAA 88 11/02/2020    GFRAA 88 11/02/2020    AST 19 12/29/2021    ALT 20 12/29/2021    TP 6.6 12/29/2021    ALB 4.5 12/29/2021    CA 9.1 07/08/2024    MG 2.4 05/08/2017       Urinalysis Results (last three years):  Recent Labs     03/13/23  1339 08/28/24  1321   SPECGRAVITY 1.030 1.030   PHURINE 5.0 5.5   NITRITE Negative Negative       Urine Culture Results (last three years):  No results found for: \"URINECUL\"    Imaging  No results found.    Assessment & Plan:   Urinary urgency and UUI     Stable on oxybutynin 10mg ER with minimal side effects. No infections.    Script renewed.   Follow up in 1 year or sooner, prn.       Kylah Alexander PA-C, 8/28/2024

## 2024-10-24 ENCOUNTER — TELEPHONE (OUTPATIENT)
Dept: SURGERY | Facility: CLINIC | Age: 74
End: 2024-10-24

## 2024-10-24 NOTE — TELEPHONE ENCOUNTER
Patient calling to request script for Oxybutynin to be sent to [facility]. Please update at [phone], thanks.   *Patient informed of the 3 business day turnaround time.

## 2024-11-01 ENCOUNTER — OFFICE VISIT (OUTPATIENT)
Dept: SURGERY | Facility: CLINIC | Age: 74
End: 2024-11-01
Payer: MEDICARE

## 2024-11-01 DIAGNOSIS — N32.81 OAB (OVERACTIVE BLADDER): Primary | ICD-10-CM

## 2024-11-01 DIAGNOSIS — R82.90 URINE FINDING: ICD-10-CM

## 2024-11-01 DIAGNOSIS — N39.41 URGE INCONTINENCE: ICD-10-CM

## 2024-11-01 LAB
APPEARANCE: CLEAR
BILIRUBIN: NEGATIVE
GLUCOSE (URINE DIPSTICK): NEGATIVE MG/DL
KETONES (URINE DIPSTICK): NEGATIVE MG/DL
LEUKOCYTES: NEGATIVE
MULTISTIX LOT#: ABNORMAL NUMERIC
NITRITE, URINE: NEGATIVE
OCCULT BLOOD: NEGATIVE
PH, URINE: 7 (ref 4.5–8)
PROTEIN (URINE DIPSTICK): 30 MG/DL
SPECIFIC GRAVITY: 1.02 (ref 1–1.03)
URINE-COLOR: YELLOW
UROBILINOGEN,SEMI-QN: 0.2 MG/DL (ref 0–1.9)

## 2024-11-01 PROCEDURE — 99213 OFFICE O/P EST LOW 20 MIN: CPT | Performed by: PHYSICIAN ASSISTANT

## 2024-11-01 PROCEDURE — 81003 URINALYSIS AUTO W/O SCOPE: CPT | Performed by: PHYSICIAN ASSISTANT

## 2024-11-01 RX ORDER — TROSPIUM CHLORIDE 20 MG/1
20 TABLET, FILM COATED ORAL 2 TIMES DAILY
Qty: 60 TABLET | Refills: 5 | Status: SHIPPED | OUTPATIENT
Start: 2024-11-01

## 2024-11-01 RX ORDER — MAGNESIUM 200 MG
TABLET ORAL
COMMUNITY

## 2024-11-01 RX ORDER — GARLIC EXTRACT 500 MG
1 CAPSULE ORAL DAILY
COMMUNITY

## 2024-11-01 NOTE — PROGRESS NOTES
Subjective:   Jaclyn Block is a 73 year old female with hx of HL, CAD, emphysema, JAYSON who presents for follow up.    Patient was seen a few months ago in follow. Was doing well at that time with oxybutyin 10mg ER. Discussed ASE and options of alternates, she elected to continued.    She returns today because she would like to try a different medication. Has noticed some memory changes.   Insurance will cover solifenacin, tolterodine, fesoterodine, and trospium.      Still feels she is doing well. Tolerating medication well.   Some dry mouth in the morning, but no constipation. No dysuria, gross hematuria. No infections. No hx of stones.      Prior urine cytology negative.   UA negative.     Former smoker.    History/Other:    Chief Complaint Reviewed and Verified  Nursing Notes Reviewed and   Verified  Allergies Reviewed  Medications Reviewed         Current Outpatient Medications   Medication Sig Dispense Refill    acidophilus-pectin Oral Cap Take 1 capsule by mouth daily.      Magnesium 200 MG Oral Tab Take by mouth.      gabapentin 100 MG Oral Cap TAKE 2 CAPSULES 3 TIMES A DAY BY ORAL ROUTE FOR 21 DAYS.      Vitamin D-Vitamin K (DECARA K) 1250-200 MCG Oral Cap Take 1 capsule by mouth daily with breakfast.      Menaquinone-7 (VITAMIN K2 OR) Take by mouth.      MAGNESIUM-ZINC OR Take by mouth.      Cholecalciferol 125 MCG (5000 UT) Oral Tab Take 1 tablet (5,000 Units total) by mouth daily.      aspirin 81 MG Oral Tab EC Take 1 tablet (81 mg total) by mouth in the morning and 1 tablet (81 mg total) before bedtime. 60 tablet 0    oxybutynin ER 10 MG Oral Tablet 24 Hr Take 1 tablet (10 mg total) by mouth daily. 90 tablet 3    ezetimibe 10 MG Oral Tab       levocetirizine 5 MG Oral Tab Take 1 tablet (5 mg total) by mouth nightly.      Melatonin 5 MG Oral Tab nightly as needed.      MYCOPHENOLATE MOFETIL 500 MG Oral Tab TAKE ONE TABLET BY MOUTH ONE TIME DAILY (Patient taking differently: Take 1 tablet (500 mg total)  by mouth 2 (two) times daily.) 90 tablet 1    lisinopril 10 MG Oral Tab Take 1 tablet (10 mg total) by mouth daily.      alendronate 35 MG Oral Tab Take 1 tablet (35 mg total) by mouth every 7 days. 12 tablet 3    buPROPion HCl ER, SR, 100 MG Oral Tablet 12 Hr Take 1 tablet (100 mg total) by mouth 2 (two) times daily. (Patient taking differently: Take 1 tablet (100 mg total) by mouth daily.) 180 tablet 2    Multiple Vitamins-Minerals (ICAPS AREDS FORMULA) Oral Tab Take by mouth.      S-Adenosylmethionine (XOCHITL-E OR) Take 1 capsule by mouth daily.        Calcium Carbonate-Vitamin D 500-125 MG-UNIT Oral Tab Take 1 tablet by mouth daily.      Glucosamine-Chondroit-Vit C-Mn (GLUCOSAMINE CHONDR 500 COMPLEX OR) Take 1 tablet by mouth daily.        ferrous sulfate 325 (65 FE) MG Oral Tab EC Take 1 tablet (325 mg total) by mouth daily with breakfast.      omega-3 fatty acids 1000 MG Oral Cap Take 1,000 mg by mouth daily.      Coenzyme Q-10 100 MG Oral Cap Take 100 mg by mouth daily.      amoxicillin 500 MG Oral Cap Take 1 capsule (500 mg total) by mouth 3 (three) times daily. (Patient not taking: Reported on 11/1/2024)      ipratropium 17 MCG/ACT Inhalation Aero Soln Inhale 2 puffs into the lungs every 6 (six) hours. (Patient not taking: Reported on 11/1/2024)      Meloxicam 15 MG Oral Tab Take 1 tablet (15 mg total) by mouth every morning. (Patient not taking: Reported on 11/1/2024)      pantoprazole 40 MG Oral Tab EC Take 1 tablet (40 mg total) by mouth every morning. (Patient not taking: Reported on 11/1/2024)         Review of Systems:  Pertinent items are noted in HPI.      Objective:   There were no vitals taken for this visit. Estimated body mass index is 29.05 kg/m² as calculated from the following:    Height as of 6/26/24: 5' 3\" (1.6 m).    Weight as of 7/8/24: 164 lb (74.4 kg).    No distress  Non labored respirations  Drinking large [employer] coffee    Laboratory Data:  Lab Results   Component Value Date     WBC 6.0 07/08/2024    HGB 13.4 07/08/2024    .0 (L) 07/08/2024     Lab Results   Component Value Date     07/08/2024    K 4.0 07/08/2024     07/08/2024    CO2 26.0 07/08/2024    BUN 17 07/08/2024     (H) 07/08/2024    GFRAA 88 11/02/2020    GFRAA 88 11/02/2020    AST 19 12/29/2021    ALT 20 12/29/2021    TP 6.6 12/29/2021    ALB 4.5 12/29/2021    CA 9.1 07/08/2024    MG 2.4 05/08/2017       Urinalysis Results (last three years):  Recent Labs     03/13/23  1339 08/28/24  1321 11/01/24  1304   SPECGRAVITY 1.030 1.030 1.020   PHURINE 5.0 5.5 7.0   NITRITE Negative Negative Negative       Urine Culture Results (last three years):  No results found for: \"URINECUL\"    Imaging  No results found.    Assessment & Plan:   OAB with UUI    Reviewed options with patient, will trial trospium 20mg BID  If not covered can trial fesoterodine or solifenacin    Kylah Alexander PA-C, 11/1/2024

## 2024-12-13 PROBLEM — K59.03 CONSTIPATION DUE TO PAIN MEDICATION: Status: ACTIVE | Noted: 2024-09-10

## 2024-12-13 PROBLEM — M54.2 NECK PAIN: Status: ACTIVE | Noted: 2024-05-24

## 2024-12-13 PROBLEM — I70.0 ATHEROSCLEROSIS OF AORTA: Status: ACTIVE | Noted: 2023-09-11

## 2024-12-13 PROBLEM — L30.9 DERMATITIS: Status: ACTIVE | Noted: 2023-11-25

## 2024-12-13 PROBLEM — R53.1 WEAKNESS: Status: ACTIVE | Noted: 2024-05-24

## 2024-12-13 PROBLEM — M25.511 PAIN OF RIGHT SHOULDER JOINT ON MOVEMENT: Status: ACTIVE | Noted: 2024-05-24

## 2024-12-13 PROBLEM — N95.0 POSTMENOPAUSAL BLEEDING: Status: ACTIVE | Noted: 2024-01-29

## 2025-03-28 RX ORDER — TROSPIUM CHLORIDE 20 MG/1
20 TABLET, FILM COATED ORAL 2 TIMES DAILY
Qty: 180 TABLET | Refills: 1 | Status: SHIPPED | OUTPATIENT
Start: 2025-03-28

## 2025-08-04 ENCOUNTER — OFFICE VISIT (OUTPATIENT)
Dept: SURGERY | Facility: CLINIC | Age: 75
End: 2025-08-04

## 2025-08-04 DIAGNOSIS — N32.81 OAB (OVERACTIVE BLADDER): Primary | ICD-10-CM

## 2025-08-04 DIAGNOSIS — N39.41 URGE INCONTINENCE: ICD-10-CM

## 2025-08-04 DIAGNOSIS — R82.90 URINE FINDING: ICD-10-CM

## 2025-08-04 LAB
APPEARANCE: CLEAR
BILIRUBIN: NEGATIVE
GLUCOSE (URINE DIPSTICK): NEGATIVE MG/DL
KETONES (URINE DIPSTICK): NEGATIVE MG/DL
LEUKOCYTES: NEGATIVE
MULTISTIX LOT#: NORMAL NUMERIC
NITRITE, URINE: NEGATIVE
OCCULT BLOOD: NEGATIVE
PH, URINE: 7 (ref 4.5–8)
PROTEIN (URINE DIPSTICK): NEGATIVE MG/DL
SPECIFIC GRAVITY: 1.01 (ref 1–1.03)
URINE-COLOR: YELLOW
UROBILINOGEN,SEMI-QN: 0.2 MG/DL (ref 0–1.9)

## 2025-08-04 PROCEDURE — 99213 OFFICE O/P EST LOW 20 MIN: CPT | Performed by: PHYSICIAN ASSISTANT

## 2025-08-04 PROCEDURE — 81002 URINALYSIS NONAUTO W/O SCOPE: CPT | Performed by: PHYSICIAN ASSISTANT

## 2025-08-04 PROCEDURE — 51798 US URINE CAPACITY MEASURE: CPT | Performed by: PHYSICIAN ASSISTANT

## 2025-08-04 RX ORDER — TROSPIUM CHLORIDE ER 60 MG/1
60 CAPSULE ORAL DAILY
Qty: 90 CAPSULE | Refills: 3 | Status: SHIPPED | OUTPATIENT
Start: 2025-08-04

## 2025-08-31 ENCOUNTER — LAB ENCOUNTER (OUTPATIENT)
Dept: LAB | Facility: HOSPITAL | Age: 75
End: 2025-08-31
Attending: PHYSICIAN ASSISTANT

## 2025-08-31 DIAGNOSIS — R31.0 GROSS HEMATURIA: ICD-10-CM

## 2025-08-31 LAB — RBC #/AREA URNS AUTO: >10 /HPF

## 2025-08-31 PROCEDURE — 81015 MICROSCOPIC EXAM OF URINE: CPT

## 2025-08-31 PROCEDURE — 87086 URINE CULTURE/COLONY COUNT: CPT

## (undated) DEVICE — SOLUTION PREP 26ML 0.7% POVACRYLEX 74% ISO

## (undated) DEVICE — APPLICATOR PREP 26ML CHG 2% ISO ALC 70%

## (undated) DEVICE — 2T11 #2 PDO 36 X 36: Brand: 2T11 #2 PDO 36 X 36

## (undated) DEVICE — SOLUTION IRRIG 3000ML 0.9% NACL FLX CONT

## (undated) DEVICE — PACK,UNIVERSAL,SPLIT,II: Brand: MEDLINE

## (undated) DEVICE — DRESSING ALG 3.5X10IN TAN CARBOXYMETHYL CELOS

## (undated) DEVICE — BOWL BNE CEM MIX DISP QUIK-VAC

## (undated) DEVICE — SUT COAT VCRL 0 27IN CT-1 ABSRB VLT 36MM 1/2

## (undated) DEVICE — NEEDLE SPNL 18GA L3.5IN W/ QNCKE SHARPER BVL

## (undated) DEVICE — ADHESIVE SKIN TOP FOR WND CLSR DERMBND ADV

## (undated) DEVICE — DRAPE,U/ SHT,SPLIT,PLAS,STERIL: Brand: MEDLINE

## (undated) DEVICE — PIN FIX L3IN FLUT HDLSS FOR MAKO TOT KNEE SYS

## (undated) DEVICE — HOOD: Brand: FLYTE

## (undated) DEVICE — 3M™ COBAN™ NL STERILE NON-LATEX SELF-ADHERENT WRAP, 2084S, 4 IN X 5 YD (10 CM X 4,5 M), 18 ROLLS/CASE: Brand: 3M™ COBAN™

## (undated) DEVICE — SOLUTION IRRIG 1000ML 0.9% NACL USP BTL

## (undated) DEVICE — PACK CDS TOTAL KNEE

## (undated) DEVICE — GAMMEX® PI HYBRID SIZE 8.5, STERILE POWDER-FREE SURGICAL GLOVE, POLYISOPRENE AND NEOPRENE BLEND: Brand: GAMMEX

## (undated) DEVICE — SUT COAT VCRL 2-0 27IN ABSRB UD 26MM CT-2

## (undated) DEVICE — VIOLET BRAIDED (POLYGLACTIN 910), SYNTHETIC ABSORBABLE SUTURE: Brand: COATED VICRYL

## (undated) DEVICE — DISPOSABLE TOURNIQUET CUFF SINGLE BLADDER, DUAL PORT AND QUICK CONNECT CONNECTOR: Brand: COLOR CUFF

## (undated) DEVICE — GAMMEX® NON-LATEX PI ORTHO SIZE 8.5, STERILE POLYISOPRENE POWDER-FREE SURGICAL GLOVE: Brand: GAMMEX

## (undated) DEVICE — BLADE SAW 1.14X2.17IN THK0.025IN CUT

## (undated) DEVICE — ANTIBACTERIAL VIOLET BRAIDED (POLYGLACTIN 910), SYNTHETIC ABSORBABLE SUTURE: Brand: COATED VICRYL

## (undated) DEVICE — Device: Brand: STABLECUT®

## (undated) DEVICE — SHEET,DRAPE,53X77,STERILE: Brand: MEDLINE

## (undated) DEVICE — GAMMEX® PI HYBRID SIZE 8, STERILE POWDER-FREE SURGICAL GLOVE, POLYISOPRENE AND NEOPRENE BLEND: Brand: GAMMEX

## (undated) DEVICE — SUT MCRYL 3-0 27IN ABSRB UD L24MM PS-1

## (undated) DEVICE — SYRINGE MED 20ML STD CLR PLAS LL TIP N CTRL

## (undated) DEVICE — T5 HOOD WITH PEEL AWAY FACE SHIELD

## (undated) DEVICE — DRESSING SUR 9X25CM SIL SP CVR WTRPRF VIR

## (undated) DEVICE — HANDPIECE SET WITH HIGH FLOW TIP AND SUCTION TUBE: Brand: INTERPULSE

## (undated) NOTE — IP AVS SNAPSHOT
[facility] [place] [address] [place], [address] ~ [phone]                Discharge Summary   5/7/2017    Richi Block           Admission Information        Provider Department    5/7/2017 Lara Lackey MD  [id number] Take 100 mg by mouth 2 (two) times daily. Calcium Carbonate-Vitamin D 500-125 MG-UNIT Tabs        Take 1 tablet by mouth daily. Coenzyme Q-10 100 MG Caps        Take 100 mg by mouth daily. Contact information:    [address] [address] [phone]        Future Appointments     May 20, 2017 11:00 AM   Follow up with Jazmin Lopez, [address] [place] [address] ([place] [address] )    [facility] 0.58 (05/08/17)  8.9 (05/07/17)  54 (L) (05/07/17)  14 (L)      Metabolic Lab Results  (Last result in the past 90 days)    ALT Bilirubin,Total Total Protein Albumin Sodium Potassium Chloride    (05/07/17)  24 (05/07/17)  0.3 (05/07/17)  7.0 (05/07/17)  3. For medical emergencies, dial 911.             _____________________________________________________________________________    Medication Side Effects - Medications to be taken at home  As your caregivers, we want you to be aware of the medications you a Most common side effects:  Depends on the specific medication but generally include: diarrhea, constipation, headache, allergic reaction (itching, rash, hives)   What to report to your healthcare team: Pain, nausea/vomiting, no bowel movement in 2+ days, d